# Patient Record
Sex: FEMALE | Race: ASIAN | Employment: FULL TIME | URBAN - METROPOLITAN AREA
[De-identification: names, ages, dates, MRNs, and addresses within clinical notes are randomized per-mention and may not be internally consistent; named-entity substitution may affect disease eponyms.]

---

## 2017-01-16 ENCOUNTER — HOSPITAL ENCOUNTER (OUTPATIENT)
Age: 22
Discharge: HOME | End: 2017-01-16
Attending: FAMILY MEDICINE
Payer: COMMERCIAL

## 2017-01-16 VITALS
HEART RATE: 108 BPM | TEMPERATURE: 98 F | RESPIRATION RATE: 18 BRPM | DIASTOLIC BLOOD PRESSURE: 79 MMHG | OXYGEN SATURATION: 98 % | SYSTOLIC BLOOD PRESSURE: 117 MMHG

## 2017-01-16 DIAGNOSIS — J06.9 UPPER RESPIRATORY TRACT INFECTION, UNSPECIFIED TYPE: Primary | ICD-10-CM

## 2017-01-16 DIAGNOSIS — Z20.89 EXPOSURE TO PNEUMONIA: ICD-10-CM

## 2017-01-16 PROCEDURE — 99213 OFFICE O/P EST LOW 20 MIN: CPT

## 2017-01-16 PROCEDURE — 99214 OFFICE O/P EST MOD 30 MIN: CPT

## 2017-01-16 RX ORDER — FLUTICASONE PROPIONATE 50 MCG
SPRAY, SUSPENSION (ML) NASAL
Qty: 1 INHALER | Refills: 0 | Status: SHIPPED | OUTPATIENT
Start: 2017-01-16 | End: 2017-11-02 | Stop reason: ALTCHOICE

## 2017-01-16 RX ORDER — AZITHROMYCIN 250 MG/1
TABLET, FILM COATED ORAL
Qty: 1 PACKAGE | Refills: 0 | Status: SHIPPED | OUTPATIENT
Start: 2017-01-16 | End: 2017-11-02 | Stop reason: ALTCHOICE

## 2017-01-16 RX ORDER — CODEINE PHOSPHATE AND GUAIFENESIN 10; 100 MG/5ML; MG/5ML
SOLUTION ORAL NIGHTLY PRN
Qty: 120 ML | Refills: 0 | Status: SHIPPED | OUTPATIENT
Start: 2017-01-16 | End: 2017-11-02 | Stop reason: ALTCHOICE

## 2017-01-17 NOTE — ED PROVIDER NOTES
Patient Seen in: [facility] In [facility]    History   Patient presents with:  Fever  Cough/URI    Stated Complaint: fever,cough     HPI    This 50-year-old female presents to the office with complaint of fever, sore throat, cough which is be 1959 117/79 mmHg   Pulse 01/16/17 1959 108   Resp 01/16/17 1959 18   Temp 01/16/17 1959 97.5 °F (36.4 °C)   Temp src 01/16/17 1959 Temporal   SpO2 01/16/17 1959 98 %   O2 Device 01/16/17 1959 None (Room air)       Current:/79 mmHg  Pulse 108  Temp(Sr MCG/ACT Nasal Suspension  Use 2 sprays each nostril once daily after shower. Stop if you develop nose bleeds.   Qty: 1 Inhaler Refills: 0  Associated Diagnoses:Upper respiratory tract infection, unspecified type    azithromycin (ZITHROMAX Z-ALY) 250 MG Oral

## 2017-01-17 NOTE — ED INITIAL ASSESSMENT (HPI)
Pt c/o fever x 2 days. T-max 101. Also c/o sore throat and cough. States has had cough for past 2 weeks but has become worse the past couple of days. Sister sick with similar symptoms. Mother recently treated for pneumonia.

## 2017-11-02 ENCOUNTER — LAB ENCOUNTER (OUTPATIENT)
Dept: LAB | Age: 22
End: 2017-11-02
Attending: FAMILY MEDICINE
Payer: COMMERCIAL

## 2017-11-02 ENCOUNTER — OFFICE VISIT (OUTPATIENT)
Dept: FAMILY MEDICINE CLINIC | Facility: CLINIC | Age: 22
End: 2017-11-02

## 2017-11-02 ENCOUNTER — TELEPHONE (OUTPATIENT)
Dept: FAMILY MEDICINE CLINIC | Facility: CLINIC | Age: 22
End: 2017-11-02

## 2017-11-02 VITALS
RESPIRATION RATE: 18 BRPM | BODY MASS INDEX: 31.49 KG/M2 | OXYGEN SATURATION: 99 % | DIASTOLIC BLOOD PRESSURE: 60 MMHG | TEMPERATURE: 98 F | WEIGHT: 148 LBS | HEIGHT: 57.5 IN | HEART RATE: 69 BPM | SYSTOLIC BLOOD PRESSURE: 104 MMHG

## 2017-11-02 DIAGNOSIS — R53.83 OTHER FATIGUE: ICD-10-CM

## 2017-11-02 DIAGNOSIS — F99 INSOMNIA DUE TO OTHER MENTAL DISORDER: ICD-10-CM

## 2017-11-02 DIAGNOSIS — L70.0 ACNE VULGARIS: ICD-10-CM

## 2017-11-02 DIAGNOSIS — F51.05 INSOMNIA DUE TO OTHER MENTAL DISORDER: ICD-10-CM

## 2017-11-02 DIAGNOSIS — Z13.6 SCREENING FOR HEART DISEASE: ICD-10-CM

## 2017-11-02 DIAGNOSIS — N92.6 IRREGULAR MENSES: ICD-10-CM

## 2017-11-02 DIAGNOSIS — N92.1 MENOMETRORRHAGIA: ICD-10-CM

## 2017-11-02 DIAGNOSIS — N92.6 IRREGULAR MENSES: Primary | ICD-10-CM

## 2017-11-02 PROCEDURE — 84146 ASSAY OF PROLACTIN: CPT | Performed by: FAMILY MEDICINE

## 2017-11-02 PROCEDURE — 80050 GENERAL HEALTH PANEL: CPT | Performed by: FAMILY MEDICINE

## 2017-11-02 PROCEDURE — 90686 IIV4 VACC NO PRSV 0.5 ML IM: CPT | Performed by: FAMILY MEDICINE

## 2017-11-02 PROCEDURE — 80061 LIPID PANEL: CPT | Performed by: FAMILY MEDICINE

## 2017-11-02 PROCEDURE — 84403 ASSAY OF TOTAL TESTOSTERONE: CPT | Performed by: FAMILY MEDICINE

## 2017-11-02 PROCEDURE — 36415 COLL VENOUS BLD VENIPUNCTURE: CPT | Performed by: FAMILY MEDICINE

## 2017-11-02 PROCEDURE — 84439 ASSAY OF FREE THYROXINE: CPT | Performed by: FAMILY MEDICINE

## 2017-11-02 PROCEDURE — 84402 ASSAY OF FREE TESTOSTERONE: CPT | Performed by: FAMILY MEDICINE

## 2017-11-02 PROCEDURE — 84305 ASSAY OF SOMATOMEDIN: CPT | Performed by: FAMILY MEDICINE

## 2017-11-02 PROCEDURE — 90471 IMMUNIZATION ADMIN: CPT | Performed by: FAMILY MEDICINE

## 2017-11-02 PROCEDURE — 82627 DEHYDROEPIANDROSTERONE: CPT | Performed by: FAMILY MEDICINE

## 2017-11-02 PROCEDURE — 82533 TOTAL CORTISOL: CPT | Performed by: FAMILY MEDICINE

## 2017-11-02 PROCEDURE — 99203 OFFICE O/P NEW LOW 30 MIN: CPT | Performed by: FAMILY MEDICINE

## 2017-11-02 RX ORDER — HYDROXYZINE 50 MG/1
50 TABLET, FILM COATED ORAL NIGHTLY PRN
Qty: 30 TABLET | Refills: 0 | Status: SHIPPED | OUTPATIENT
Start: 2017-11-02 | End: 2017-11-02

## 2017-11-02 RX ORDER — IBUPROFEN 800 MG/1
800 TABLET ORAL 3 TIMES DAILY
Qty: 21 TABLET | Refills: 0 | Status: SHIPPED | OUTPATIENT
Start: 2017-11-02 | End: 2018-01-09

## 2017-11-02 RX ORDER — ADAPALENE 3 MG/G
1 GEL TOPICAL NIGHTLY
Qty: 45 G | Refills: 12 | Status: SHIPPED | OUTPATIENT
Start: 2017-11-02 | End: 2019-05-15

## 2017-11-02 RX ORDER — HYDROXYZINE 50 MG/1
50 TABLET, FILM COATED ORAL NIGHTLY PRN
Qty: 30 TABLET | Refills: 0 | Status: SHIPPED | OUTPATIENT
Start: 2017-11-02 | End: 2018-01-09

## 2017-11-02 NOTE — PATIENT INSTRUCTIONS
My fitness pal angelita by Carly- download and track all food and drinks  -limit 70g carbohydrate daily  Limit 1500 calories daily  -Evaluated regular meals which are high in carbohydrate with poor nutrition and lack of vegetables  -Needs exercise 5 ti · Read labels on makeup and moisturizers. Choose those that are water based and oil free. Look for the term noncomedogenic. It means that the product won’t clog your pores. Caring for your skin  The right skin care routine can help keep your skin healthy. Having a period (menstruation) is a normal, healthy part of being a woman. It’s also part of the menstrual cycle, a process that makes it possible for women to become pregnant.  The first day of your period is the first day of your menstrual cycle.   A woma © 3358-0879 [employer] [address]. [address] [facility], [facility], [facility] [place]. All rights reserved. This information is not intended as a substitute for professional medical care. Always follow your healthcare professional's instructions.

## 2017-11-02 NOTE — PROGRESS NOTES
CHIEF COMPLAINT: Patient presents with:  Establish Care: continuing care        HPI:     Cathy Gooden is a 25year old female presents for establish care and discuss irregular menses since of onset at age 16 cycle varies lasts 7-14 days every 45-9 sedation. no driving for 8 hours after use. ). Disp: 30 tablet Rfl: 0   ibuprofen 800 MG Oral Tab Take 1 tablet (800 mg total) by mouth 3 (three) times daily.  Take with food and water Disp: 21 tablet Rfl: 0       Allergies:    Seasonal                Runny 12    2. Irregular menses  PCO S versus endometriosis versus other cause rule out  - CORTISOL; Future-draw 8 AM  - IGF-1; Future  - PROLACTIN; Future  - TESTOSTERONE,FREE AND TOTAL;  Future  - TSH+FREE T4; Future  - DEHYDROEPIANDROSTERONE SULFATE; Future  - Series) due on 06/17/1996  Hepatitis A Vaccines(1 of 2 - Standard Series) due on 06/17/1997  MMR Vaccines(1 of 2) due on 06/17/1997  Annual Physical due on 06/17/1998  DTaP,Tdap,and Td Vaccines(1 - Tdap) due on 06/17/2007  HPV Vaccines(1 of 3 - Female 3 Do

## 2017-11-02 NOTE — TELEPHONE ENCOUNTER
hydroxyzine cancelled in prescription cancellation error, gave verbal to pharmacy to fill hydroxyzine as ordered by  at today's visit. notified patient pharmacy will call when ready to .

## 2017-11-06 ENCOUNTER — TELEPHONE (OUTPATIENT)
Dept: FAMILY MEDICINE CLINIC | Facility: CLINIC | Age: 22
End: 2017-11-06

## 2017-11-06 NOTE — TELEPHONE ENCOUNTER
Spoke with pt, reviewed results and recommendations. Pt verbalized understanding, will follow up as planned      Notes Recorded by Nora Marr DO on 11/6/2017 at 8:17 AM CST  Results reviewed. Tests show no significant abnormalities except low HDL.  Ple

## 2018-01-09 ENCOUNTER — TELEPHONE (OUTPATIENT)
Dept: FAMILY MEDICINE CLINIC | Facility: CLINIC | Age: 23
End: 2018-01-09

## 2018-01-09 DIAGNOSIS — N92.1 MENOMETRORRHAGIA: ICD-10-CM

## 2018-01-09 DIAGNOSIS — F51.05 INSOMNIA DUE TO OTHER MENTAL DISORDER: ICD-10-CM

## 2018-01-09 DIAGNOSIS — F99 INSOMNIA DUE TO OTHER MENTAL DISORDER: ICD-10-CM

## 2018-01-10 NOTE — TELEPHONE ENCOUNTER
Pt requesting refill of Hydroxyzine and Ibuprofen, no protocol, unable to approve:     Last Time Medication was Filled:  11/2/17    Last Office Visit with PCP: 11/2/17    No future appointments.

## 2018-01-11 RX ORDER — IBUPROFEN 800 MG/1
TABLET ORAL
Qty: 21 TABLET | Refills: 0 | Status: SHIPPED | OUTPATIENT
Start: 2018-01-11 | End: 2019-05-15

## 2018-01-11 RX ORDER — HYDROXYZINE 50 MG/1
TABLET, FILM COATED ORAL
Qty: 30 TABLET | Refills: 0 | Status: SHIPPED | OUTPATIENT
Start: 2018-01-11 | End: 2018-10-29

## 2018-01-11 NOTE — TELEPHONE ENCOUNTER
LMOM to return call to the office. Provided pt office phone [phone] along with office hours given.

## 2018-01-11 NOTE — TELEPHONE ENCOUNTER
Spoke to patient, patient states she will complete US. Will follow up in office. No future appointments.

## 2018-01-11 NOTE — TELEPHONE ENCOUNTER
Last refill until completes pelvic ultrasound/testing for heavy prolonged menses. Needs to complete and FU in office within one week of completion.

## 2018-02-04 ENCOUNTER — HOSPITAL ENCOUNTER (OUTPATIENT)
Age: 23
Discharge: HOME | End: 2018-02-04
Attending: FAMILY MEDICINE
Payer: COMMERCIAL

## 2018-02-04 VITALS
OXYGEN SATURATION: 97 % | RESPIRATION RATE: 16 BRPM | TEMPERATURE: 99 F | WEIGHT: 150 LBS | BODY MASS INDEX: 32 KG/M2 | SYSTOLIC BLOOD PRESSURE: 117 MMHG | DIASTOLIC BLOOD PRESSURE: 71 MMHG | HEART RATE: 87 BPM

## 2018-02-04 DIAGNOSIS — J20.9 ACUTE BRONCHITIS, UNSPECIFIED ORGANISM: Primary | ICD-10-CM

## 2018-02-04 DIAGNOSIS — J02.9 ACUTE PHARYNGITIS, UNSPECIFIED ETIOLOGY: ICD-10-CM

## 2018-02-04 DIAGNOSIS — H92.02 ACUTE OTALGIA, LEFT: ICD-10-CM

## 2018-02-04 LAB — POCT RAPID STREP: NEGATIVE

## 2018-02-04 PROCEDURE — 87430 STREP A AG IA: CPT | Performed by: FAMILY MEDICINE

## 2018-02-04 PROCEDURE — 99214 OFFICE O/P EST MOD 30 MIN: CPT

## 2018-02-04 PROCEDURE — 87081 CULTURE SCREEN ONLY: CPT | Performed by: FAMILY MEDICINE

## 2018-02-04 RX ORDER — AMOXICILLIN 875 MG/1
875 TABLET, COATED ORAL 2 TIMES DAILY
Qty: 14 TABLET | Refills: 0 | Status: SHIPPED | OUTPATIENT
Start: 2018-02-04 | End: 2018-03-19 | Stop reason: ALTCHOICE

## 2018-02-04 RX ORDER — ALBUTEROL SULFATE 90 UG/1
2 AEROSOL, METERED RESPIRATORY (INHALATION) EVERY 4 HOURS PRN
Qty: 1 INHALER | Refills: 0 | Status: SHIPPED | OUTPATIENT
Start: 2018-02-04 | End: 2020-05-20 | Stop reason: ALTCHOICE

## 2018-02-04 NOTE — ED PROVIDER NOTES
Patient Seen in: [facility]    History   Patient presents with:  Fever (infectious)  Sore Throat    Stated Complaint: ST/EAR PAIN/RED EYES/FEVER    HPI    This 69-year-old female presents the office with a week history of worsen bleeding noted. PHARYNX:  Mild erythema is noted, no exudates seen, airway patent, uvula midline  NECK:  Shotty anterior cervical lymphadenopathy. No thyromegaly,  HEART: Regular rate and rhythm, no S3, S4 or murmur noted.   LUNGS: Coarse BS with prolonged with breakfast and dinner for the next 7 days. While on the antibiotics, eat yogurt or take probiotics to help replenish the good bacteria in your intestines.   Use the albuterol inhaler 2 puffs every 4 hours as needed for chest tightness, shortness of coreen

## 2018-02-04 NOTE — ED INITIAL ASSESSMENT (HPI)
Patient whispers that she has been sick for about 1 week with throat pain, fever, and ear pain. Patient reports fever as high as 101.

## 2018-02-19 DIAGNOSIS — L70.0 ACNE VULGARIS: ICD-10-CM

## 2018-02-19 RX ORDER — ADAPALENE 3 MG/G
1 GEL TOPICAL NIGHTLY
Qty: 45 G | Refills: 12
Start: 2018-02-19

## 2018-02-19 NOTE — TELEPHONE ENCOUNTER
From: Shivani Mars  Sent: 2/19/2018 4:31 PM CST  Subject: Medication Renewal Request    Sarah Stewart would like a refill of the following medications:     Adapalene 0.3 % External Gel [facility], DO]    Preferred pharmacy: [address] [place] [address]

## 2018-02-28 ENCOUNTER — HOSPITAL ENCOUNTER (OUTPATIENT)
Dept: ULTRASOUND IMAGING | Age: 23
Discharge: HOME | End: 2018-02-28
Attending: FAMILY MEDICINE
Payer: COMMERCIAL

## 2018-02-28 DIAGNOSIS — N92.6 IRREGULAR MENSES: ICD-10-CM

## 2018-02-28 DIAGNOSIS — N92.1 MENOMETRORRHAGIA: ICD-10-CM

## 2018-02-28 PROCEDURE — 76856 US EXAM PELVIC COMPLETE: CPT | Performed by: FAMILY MEDICINE

## 2018-03-01 ENCOUNTER — TELEPHONE (OUTPATIENT)
Dept: FAMILY MEDICINE CLINIC | Facility: CLINIC | Age: 23
End: 2018-03-01

## 2018-03-01 NOTE — TELEPHONE ENCOUNTER
LMOM to return call to the office. Provided pt office phone [phone] along with office hours given. Notes Recorded by Mable Mattson MD on 3/1/2018 at 8:39 AM CST  Results reviewed. Tests show no significant abnormalities. Please inform patient.  Fo

## 2018-03-01 NOTE — TELEPHONE ENCOUNTER
Pt returning nurses call, pt states she can be reached back at [phone].  Pt states she might not be able to answer phone until 3:30-4:00pm.

## 2018-03-19 ENCOUNTER — OFFICE VISIT (OUTPATIENT)
Dept: FAMILY MEDICINE CLINIC | Facility: CLINIC | Age: 23
End: 2018-03-19

## 2018-03-19 VITALS
HEART RATE: 86 BPM | OXYGEN SATURATION: 99 % | TEMPERATURE: 98 F | BODY MASS INDEX: 33 KG/M2 | RESPIRATION RATE: 18 BRPM | WEIGHT: 156 LBS | SYSTOLIC BLOOD PRESSURE: 114 MMHG | DIASTOLIC BLOOD PRESSURE: 68 MMHG

## 2018-03-19 DIAGNOSIS — E28.2 PCOS (POLYCYSTIC OVARIAN SYNDROME): ICD-10-CM

## 2018-03-19 DIAGNOSIS — F99 INSOMNIA DUE TO OTHER MENTAL DISORDER: ICD-10-CM

## 2018-03-19 DIAGNOSIS — R53.83 OTHER FATIGUE: ICD-10-CM

## 2018-03-19 DIAGNOSIS — Z23 NEED FOR VACCINATION: ICD-10-CM

## 2018-03-19 DIAGNOSIS — N92.6 IRREGULAR MENSES: Primary | ICD-10-CM

## 2018-03-19 DIAGNOSIS — F41.9 ANXIETY: ICD-10-CM

## 2018-03-19 DIAGNOSIS — F51.05 INSOMNIA DUE TO OTHER MENTAL DISORDER: ICD-10-CM

## 2018-03-19 PROCEDURE — 90715 TDAP VACCINE 7 YRS/> IM: CPT | Performed by: FAMILY MEDICINE

## 2018-03-19 PROCEDURE — 90471 IMMUNIZATION ADMIN: CPT | Performed by: FAMILY MEDICINE

## 2018-03-19 PROCEDURE — 99214 OFFICE O/P EST MOD 30 MIN: CPT | Performed by: FAMILY MEDICINE

## 2018-03-19 RX ORDER — ETONOGESTREL AND ETHINYL ESTRADIOL 11.7; 2.7 MG/1; MG/1
1 INSERT, EXTENDED RELEASE VAGINAL
Qty: 1 EACH | Refills: 6 | Status: SHIPPED | OUTPATIENT
Start: 2018-03-19 | End: 2018-10-29

## 2018-03-19 RX ORDER — ESCITALOPRAM OXALATE 5 MG/1
TABLET ORAL
Qty: 49 TABLET | Refills: 0 | Status: SHIPPED | OUTPATIENT
Start: 2018-03-19 | End: 2018-04-17

## 2018-03-19 NOTE — PATIENT INSTRUCTIONS
· Taking antidepressants can put you at risk of increased suicidal thoughts or depression  · If use feel suicidal or homicidal, call 911 or go to the nearest ER or call our office.     Condoms are recommended in all  and unmarried couples due to help Anxiety disorders tend to run in families. For some people, childhood abuse or neglect may play a role. For others, stressful life events or trauma may trigger anxiety disorders. Anxiety can trigger low self-esteem and poor coping skills.   Common anxiety d Insert the ring into your vagina as directed. Follow the directions on the prescription label. The ring will remain place for 3 weeks and is then removed for a 1-week break.  A new ring is inserted 1 week after the last ring was removed, on the same day of · breakthrough bleeding and spotting that continues beyond the 3 initial cycles of pills  · breast tenderness  · mood changes, anxiety, depression, frustration, anger, or emotional outbursts  · increased sensitivity to sun or ultraviolet light  · nausea  · Store at room temperature between 15 and 30 degrees C (59 and 86 degrees F) for up to 4 months. The product will  after 4 months. Protect from light. Throw away any unused medicine after the expiration date.   What should I tell my health care provide This medicine can make your body retain fluid, making your fingers, hands, or ankles swell. Your blood pressure can go up. Contact your doctor or health care professional if you feel you are retaining fluid.   This medicine can make you more sensitive to th

## 2018-03-19 NOTE — PROGRESS NOTES
CHIEF COMPLAINT: Patient presents with: Follow - Up: U/S results and immunizations        HPI:     Shivani Mars is a 25year old female presents for discuss labs, pelvic ultrasound.   Menstrual cycle is every 45-90 days and can last anywhere from Paternal Grandmother    • Heart Attack Paternal Grandfather    • Diabetes Paternal Grandfather    • Hypertension Paternal Grandfather    • No Known Problems Brother    • No Known Problems Brother    • No Known Problems Brother       Social History: Smoking icteric bilaterally. Oral pharynx clear without normal finding. Moist mucous membranes. Neck: supple. No adenopathy. No thyromegaly. Heart: RRR without S3 or S4 murmur . Clear S1S2  Lungs: clear to auscultation bilaterally.  No rales, rhonchi or wheeze • Chol/HDL Ratio 11/02/2017 3.50    • Non HDL Chol 11/02/2017 110    • WBC 11/02/2017 8.1    • RBC 11/02/2017 4.25    • HGB 11/02/2017 12.4    • HCT 11/02/2017 38.1    • PLT 11/02/2017 371.0    • MCV 11/02/2017 89.6    • MCH 11/02/2017 29.2    • MCHC 11/ syndrome)  Normal ultrasound negative PCO S lab workup  At risk of infertility and diabetes, hyperlipidemia, metabolic syndrome  Weight loss recommended. Exercise cardio minimum 150 minutes a week. Possible dysmenorrhea may also have endometriosis.   Woul Vaccines(1 of 2) due on 06/17/1997  Annual Physical due on 06/17/1998  DTaP,Tdap,and Td Vaccines(1 - Tdap) due on 06/17/2007  HPV Vaccines(1 of 3 - Female 3 Dose Series) due on 06/17/2007  Varicella Vaccines(1 of 2 - 2 Dose Adolescent Series) due on 06/17/

## 2018-04-18 NOTE — TELEPHONE ENCOUNTER
From: Shannon Bower  Sent: 4/17/2018 4:31 PM CDT  Subject: Medication Renewal Request    Sarah Price Tyler would like a refill of the following medications:     escitalopram 5 MG Oral Tab WILMER James    Preferred pharmacy: [place] [id number]

## 2018-04-18 NOTE — TELEPHONE ENCOUNTER
Pt requesting refill of Escitalopram, no protocol, unable to approve:     Last Time Medication was Filled:  3/19/18    Last Office Visit with PCP: 3/19/18    Future Appointments  Date Time Provider Adrianna Horn   4/20/2018 1:20 PM DO STACY Thomas

## 2018-04-19 RX ORDER — ESCITALOPRAM OXALATE 10 MG/1
10 TABLET ORAL DAILY
Qty: 90 TABLET | Refills: 0 | Status: SHIPPED
Start: 2018-04-19 | End: 2018-10-29

## 2018-04-20 PROCEDURE — 87591 N.GONORRHOEAE DNA AMP PROB: CPT | Performed by: FAMILY MEDICINE

## 2018-04-20 PROCEDURE — 87491 CHLMYD TRACH DNA AMP PROBE: CPT | Performed by: FAMILY MEDICINE

## 2018-04-20 NOTE — PROGRESS NOTES
CHIEF COMPLAINT: Patient presents with: Follow - Up: medication      HPI:     Shivani Mars is a 25year old female presents for recheck anxiety. Doing well on Lexapro. Able to study and focus. Maintaining grades. sleeping well.   Not ruminating status: Passive Smoke Exposure - Never Smoker                                      Packs/day: 0.00      Years: 0.00      Smokeless tobacco: Never Used                      Alcohol use:  No                 Medications (Active prior to today's visit):    Curr murmur . Clear S1S2  Lungs: clear to auscultation bilaterally. No rales, rhonchi or wheezes. Good inspiratory and expiratory effort  Extremities: No cyanosis, clubbing, or edema bilaterally. Skin: no acute rashes. Neuro: AOx3.   Normal gait  Psyche: nor Series) due on 06/17/2007  Varicella Vaccines(1 of 2 - 2 Dose Adolescent Series) due on 06/17/2009  Meningococcal Vaccine(1 of 1) due on 06/17/2012  Pap Smear,2 Years due on 06/17/2017  Influenza Vaccine(1) due on 09/01/2017      Patient/Caregiver Educatio

## 2018-04-20 NOTE — PATIENT INSTRUCTIONS
· Taking antidepressants can put you at risk of increased suicidal thoughts or depression  · If use feel suicidal or homicidal, call 911 or go to the nearest ER or call our office.     Understanding Anxiety Disorders  Almost everyone gets nervous now and th · Generalized anxiety disorder. This causes constant worry that can greatly disrupt your life. Getting better  You may believe that nothing can help you. Or, you might fear what others may think. But most anxiety symptoms can be eased.  Having an anxiety

## 2018-04-23 ENCOUNTER — TELEPHONE (OUTPATIENT)
Dept: FAMILY MEDICINE CLINIC | Facility: CLINIC | Age: 23
End: 2018-04-23

## 2018-04-24 ENCOUNTER — TELEPHONE (OUTPATIENT)
Dept: FAMILY MEDICINE CLINIC | Facility: CLINIC | Age: 23
End: 2018-04-24

## 2018-04-24 NOTE — TELEPHONE ENCOUNTER
Spoke with pt, reviewed results, pt verbalized understanding    Notes recorded by Cary Alvares DO on 4/22/2018 at 11:14 PM CDT  Results reviewed. Tests show no significant abnormalities. Please inform patient.

## 2018-06-18 NOTE — TELEPHONE ENCOUNTER
Patient signed medical records authorization form for the Facility identified below to disclose health information to Ta Manning / Provider Name: Irene Gonzalo [address] [facility] Address: [address]

## 2018-06-28 ENCOUNTER — TELEPHONE (OUTPATIENT)
Dept: FAMILY MEDICINE CLINIC | Facility: CLINIC | Age: 23
End: 2018-06-28

## 2018-06-28 NOTE — TELEPHONE ENCOUNTER
Pt called stating that the Insurance denied her birth control refill they stated to her that she needs a new prescription from Dr. Tono Olson for 90 days and it has to be [facility].

## 2018-06-28 NOTE — TELEPHONE ENCOUNTER
Left message for pt to [facility] that she wishes to use, and have pharmacy transfer her prescription.  Office hours and phone # provided for questions    Pt is due for annual exam

## 2018-10-29 DIAGNOSIS — F51.05 INSOMNIA DUE TO OTHER MENTAL DISORDER: ICD-10-CM

## 2018-10-29 DIAGNOSIS — F99 INSOMNIA DUE TO OTHER MENTAL DISORDER: ICD-10-CM

## 2018-10-29 DIAGNOSIS — N92.6 IRREGULAR MENSES: ICD-10-CM

## 2018-10-29 DIAGNOSIS — E28.2 PCOS (POLYCYSTIC OVARIAN SYNDROME): ICD-10-CM

## 2018-10-30 RX ORDER — ESCITALOPRAM OXALATE 10 MG/1
10 TABLET ORAL DAILY
Qty: 90 TABLET | Refills: 0 | Status: CANCELLED | OUTPATIENT
Start: 2018-10-30

## 2018-10-30 RX ORDER — ETONOGESTREL AND ETHINYL ESTRADIOL 11.7; 2.7 MG/1; MG/1
1 INSERT, EXTENDED RELEASE VAGINAL
Qty: 3 EACH | Refills: 0 | Status: SHIPPED | OUTPATIENT
Start: 2018-10-30 | End: 2019-05-15

## 2018-10-30 RX ORDER — ESCITALOPRAM OXALATE 10 MG/1
10 TABLET ORAL DAILY
Qty: 90 TABLET | Refills: 0 | Status: SHIPPED | OUTPATIENT
Start: 2018-10-30 | End: 2019-05-15 | Stop reason: ALTCHOICE

## 2018-10-30 RX ORDER — HYDROXYZINE 50 MG/1
TABLET, FILM COATED ORAL
Qty: 30 TABLET | Refills: 0 | Status: SHIPPED | OUTPATIENT
Start: 2018-10-30

## 2018-10-30 NOTE — TELEPHONE ENCOUNTER
Pt requesting refill of Nuvaring, protocol failed, unable to approve:     Last Time Medication was Filled:  3/19/18    Last Office Visit with PCP: 4/20/18    No future appointments.       Pt requesting refill of Hydroxyzine and Escitalopram, no protocol , u

## 2018-11-05 ENCOUNTER — TELEPHONE (OUTPATIENT)
Dept: FAMILY MEDICINE CLINIC | Facility: CLINIC | Age: 23
End: 2018-11-05

## 2018-11-05 NOTE — TELEPHONE ENCOUNTER
LF Escitalopram was 10/30/18 for 90 day with   Jeremy. Called to clarify if pt filled last rcs from 10/30 18 due to new request from different pharmacy. Jeremy states they LM for pt to  RX. Pt has not picked up medication.

## 2018-11-05 NOTE — TELEPHONE ENCOUNTER
[facility] States [facility] have a rx to fill Escitalopram for 30 day, but needs order for 90 day supply. Pharmacy states they called pt to clarify RX request not return call.  Informed pharmacy I will call pt to clarify which pharmacy pt would be filling medications a

## 2018-11-05 NOTE — TELEPHONE ENCOUNTER
LMOM to return call to the office. Provided pt office phone [phone] along with office hours given.     LM need clarification whereEscitalopram as RX refill was sent to [facility] 10/30/18

## 2018-11-05 NOTE — TELEPHONE ENCOUNTER
[address] from [address] [place] [address] called to see if they could get a script for 90 days for [address] 30

## 2018-11-13 NOTE — TELEPHONE ENCOUNTER
LMOM to return call to the office as this is my 2nd attempt to try to reach you. Provided pt office phone [phone] along with office hours given.

## 2018-11-14 NOTE — TELEPHONE ENCOUNTER
LMOM to return call to the office as this was my 3rd and final attempt to try to reach you. Provided pt office phone [phone] along with office hours given. Also, mailed patient a letter to contact office. Closing encounter.

## 2019-02-27 ENCOUNTER — PATIENT OUTREACH (OUTPATIENT)
Dept: FAMILY MEDICINE CLINIC | Facility: CLINIC | Age: 24
End: 2019-02-27

## 2019-04-26 ENCOUNTER — HOSPITAL ENCOUNTER (OUTPATIENT)
Age: 24
Discharge: HOME | End: 2019-04-26
Payer: COMMERCIAL

## 2019-04-26 VITALS
SYSTOLIC BLOOD PRESSURE: 126 MMHG | TEMPERATURE: 98 F | DIASTOLIC BLOOD PRESSURE: 84 MMHG | HEART RATE: 86 BPM | RESPIRATION RATE: 18 BRPM

## 2019-04-26 DIAGNOSIS — N89.8 VAGINAL DISCHARGE: Primary | ICD-10-CM

## 2019-04-26 PROCEDURE — 99214 OFFICE O/P EST MOD 30 MIN: CPT

## 2019-04-26 PROCEDURE — 87480 CANDIDA DNA DIR PROBE: CPT | Performed by: PHYSICIAN ASSISTANT

## 2019-04-26 PROCEDURE — 87510 GARDNER VAG DNA DIR PROBE: CPT | Performed by: PHYSICIAN ASSISTANT

## 2019-04-26 PROCEDURE — 87660 TRICHOMONAS VAGIN DIR PROBE: CPT | Performed by: PHYSICIAN ASSISTANT

## 2019-04-26 RX ORDER — FLUCONAZOLE 150 MG/1
TABLET ORAL
Qty: 2 TABLET | Refills: 0 | Status: SHIPPED | OUTPATIENT
Start: 2019-04-26 | End: 2019-05-15 | Stop reason: ALTCHOICE

## 2019-04-27 NOTE — ED INITIAL ASSESSMENT (HPI)
C/o vaginal irritation that started yesterday. White, creamy discharge. Denies odor. Not currently sexually active.

## 2019-04-27 NOTE — ED PROVIDER NOTES
Patient Seen in: [facility]    History   Patient presents with:  Eval-G (genital)    Stated Complaint: yeast infection    HPI    Patient is a 52-year-old female. No significant medical history.   She is not currently sexually ac within the vaginal vault. No cervical motion tenderness. Some irregularity of the coloration of the cervical os.   Neuro: Cranial nerves intact, Normal Gait    ED Course   Labs Reviewed - No data to display             MDM       With a nursing chaperone,

## 2019-04-29 NOTE — ED NOTES
Call placed to pt (or parent). Message left on voice mail thanking pt for using our services. Please call if any questions or concerns. Return phone number provided.

## 2019-05-15 ENCOUNTER — OFFICE VISIT (OUTPATIENT)
Dept: INTERNAL MEDICINE CLINIC | Facility: CLINIC | Age: 24
End: 2019-05-15
Payer: COMMERCIAL

## 2019-05-15 ENCOUNTER — TELEPHONE (OUTPATIENT)
Dept: INTERNAL MEDICINE CLINIC | Facility: CLINIC | Age: 24
End: 2019-05-15

## 2019-05-15 VITALS
DIASTOLIC BLOOD PRESSURE: 70 MMHG | HEART RATE: 125 BPM | HEIGHT: 58.25 IN | BODY MASS INDEX: 30.1 KG/M2 | WEIGHT: 145.38 LBS | TEMPERATURE: 99 F | RESPIRATION RATE: 14 BRPM | OXYGEN SATURATION: 99 % | SYSTOLIC BLOOD PRESSURE: 124 MMHG

## 2019-05-15 DIAGNOSIS — N92.1 MENOMETRORRHAGIA: ICD-10-CM

## 2019-05-15 DIAGNOSIS — E55.9 VITAMIN D DEFICIENCY: ICD-10-CM

## 2019-05-15 DIAGNOSIS — E28.2 PCOS (POLYCYSTIC OVARIAN SYNDROME): ICD-10-CM

## 2019-05-15 DIAGNOSIS — N92.6 IRREGULAR MENSES: ICD-10-CM

## 2019-05-15 DIAGNOSIS — Z00.00 ENCOUNTER FOR ANNUAL PHYSICAL EXAM: Primary | ICD-10-CM

## 2019-05-15 DIAGNOSIS — Z23 NEED FOR VACCINATION: ICD-10-CM

## 2019-05-15 DIAGNOSIS — Z13.0 SCREENING FOR IRON DEFICIENCY ANEMIA: ICD-10-CM

## 2019-05-15 DIAGNOSIS — Z13.29 SCREENING FOR THYROID DISORDER: ICD-10-CM

## 2019-05-15 DIAGNOSIS — Z13.1 SCREENING FOR DIABETES MELLITUS: ICD-10-CM

## 2019-05-15 DIAGNOSIS — Z23 NEED FOR PROPHYLACTIC VACCINATION AGAINST HUMAN PAPILLOMAVIRUS (HPV) TYPES 6, 11, 16, AND 18: Primary | ICD-10-CM

## 2019-05-15 DIAGNOSIS — Z13.228 SCREENING FOR METABOLIC DISORDER: ICD-10-CM

## 2019-05-15 DIAGNOSIS — Z13.89 SCREENING FOR GENITOURINARY CONDITION: ICD-10-CM

## 2019-05-15 DIAGNOSIS — Z13.220 SCREENING FOR LIPOID DISORDERS: ICD-10-CM

## 2019-05-15 DIAGNOSIS — L70.0 ACNE VULGARIS: ICD-10-CM

## 2019-05-15 PROCEDURE — 90471 IMMUNIZATION ADMIN: CPT | Performed by: STUDENT IN AN ORGANIZED HEALTH CARE EDUCATION/TRAINING PROGRAM

## 2019-05-15 PROCEDURE — 90651 9VHPV VACCINE 2/3 DOSE IM: CPT | Performed by: STUDENT IN AN ORGANIZED HEALTH CARE EDUCATION/TRAINING PROGRAM

## 2019-05-15 PROCEDURE — 99202 OFFICE O/P NEW SF 15 MIN: CPT | Performed by: STUDENT IN AN ORGANIZED HEALTH CARE EDUCATION/TRAINING PROGRAM

## 2019-05-15 PROCEDURE — 99385 PREV VISIT NEW AGE 18-39: CPT | Performed by: STUDENT IN AN ORGANIZED HEALTH CARE EDUCATION/TRAINING PROGRAM

## 2019-05-15 RX ORDER — ETONOGESTREL AND ETHINYL ESTRADIOL 11.7; 2.7 MG/1; MG/1
1 INSERT, EXTENDED RELEASE VAGINAL
Qty: 3 EACH | Refills: 1 | Status: SHIPPED | OUTPATIENT
Start: 2019-05-15 | End: 2019-10-24

## 2019-05-15 RX ORDER — IBUPROFEN 800 MG/1
TABLET ORAL
Qty: 21 TABLET | Refills: 0 | Status: SHIPPED | OUTPATIENT
Start: 2019-05-15 | End: 2020-06-30

## 2019-05-15 RX ORDER — ADAPALENE 3 MG/G
1 GEL TOPICAL NIGHTLY
Qty: 45 G | Refills: 0 | Status: SHIPPED | OUTPATIENT
Start: 2019-05-15 | End: 2020-05-20 | Stop reason: ALTCHOICE

## 2019-05-15 NOTE — TELEPHONE ENCOUNTER
FYI patient scheduled appointment for second HPV vaccine with  06-19-19 with nurse (Pt was told to return in 1 month after visit 05-15-19).

## 2019-05-15 NOTE — PATIENT INSTRUCTIONS
Prevention Guidelines, Women Ages 25 to 44  Screening tests and vaccines are an important part of managing your health. A screening test is done to find possible disorders or diseases in people who don't have any symptoms.  The goal is to find a disease e Type 2 diabetes, prediabetes All women diagnosed with gestational diabetes Lifelong testing every 3 years   Type 2 diabetes All women with prediabetes Every year   Gonorrhea Sexually active women at increased risk for infection At routine exams   Hepatitis Measles, mumps, rubella (MMR) All women in this age group who have no record of these infections or vaccines 1 or 2 doses   Meningococcal Women at increased risk for infection should talk with their healthcare provider 1 or more doses   Pneumococcal conjug © 4810-3433 [employer] [address]. [address] [facility] [place] [place], [facility] [place]. All rights reserved. This information is not intended as a substitute for professional medical care. Always follow your healthcare professional's instructions.

## 2019-05-15 NOTE — PROGRESS NOTES
[facility]    REASON FOR VISIT:    Dinah Curiel is a 26year old female who presents for an [address]. This is a new patient to me.    Patient's medications, allergies, past medical, surgical, social and family histories w Screen Every 10 years There are no preventive care reminders to display for this patient. Flex Sigmoidoscopy Screen  Every 5 years No results found for this or any previous visit.     Fecal Occult Blood  Annually No results found for: FOB, OCCULTSTOOL Base) MCG/ACT Inhalation Aero Soln Inhale 2 puffs into the lungs every 4 (four) hours as needed for Wheezing or Shortness of Breath. CARRY YOUR INHALER WITH YOU.  Disp: 1 Inhaler Rfl: 0      MEDICAL INFORMATION:   Past Medical History:   Diagnosis Date   • for confusion and agitation. The patient is not nervous/anxious.     EXAM:   /70 (BP Location: Right arm, Patient Position: Sitting, Cuff Size: adult)   Pulse (!) 125   Temp 98.9 °F (37.2 °C) (Oral)   Resp 14   Ht 58.25\"   Wt 145 lb 6.4 oz   LMP 04/1 ASSESSMENT AND PLAN WITH OTHER RELEVANT CHRONIC CONDITIONS   Lindaann Riedel is a 26year old female who presents for an [address]      Encounter for annual physical exam  (primary encounter diagnosis)  PCOS (polycystic ovarian syn CBC WITH DIFFERENTIAL WITH PLATELET; Future    Need for vaccination  -     HPV HUMAN PAPILLOMA VIRUS VACC 9 JUS 3 DOSE IM    Screening for diabetes mellitus  -     HEMOGLOBIN A1C; Future    Screening for lipoid disorders  -     LIPID PANEL;  Future    Sc 12/30/1996, 09/19/1997   • Hpv Virus Vaccine 9 [place] [facility] 05/15/2019   • IPV 08/19/1996, 10/19/1996, 12/30/1996, 03/30/1999, 08/13/2001   • MMR 09/19/1997, 08/13/2001   • Meningococcal-Menactra 08/12/2008   • OPV 08/19/1996, 10/19/1996   • TDAP 08/12/2008, 03/

## 2019-05-16 ENCOUNTER — LAB ENCOUNTER (OUTPATIENT)
Dept: LAB | Age: 24
End: 2019-05-16
Attending: STUDENT IN AN ORGANIZED HEALTH CARE EDUCATION/TRAINING PROGRAM
Payer: COMMERCIAL

## 2019-05-16 ENCOUNTER — TELEPHONE (OUTPATIENT)
Dept: INTERNAL MEDICINE CLINIC | Facility: CLINIC | Age: 24
End: 2019-05-16

## 2019-05-16 DIAGNOSIS — Z13.228 SCREENING FOR METABOLIC DISORDER: ICD-10-CM

## 2019-05-16 DIAGNOSIS — Z13.29 SCREENING FOR THYROID DISORDER: ICD-10-CM

## 2019-05-16 DIAGNOSIS — L70.0 ACNE VULGARIS: Primary | ICD-10-CM

## 2019-05-16 DIAGNOSIS — Z13.0 SCREENING FOR IRON DEFICIENCY ANEMIA: ICD-10-CM

## 2019-05-16 DIAGNOSIS — Z13.89 SCREENING FOR GENITOURINARY CONDITION: ICD-10-CM

## 2019-05-16 DIAGNOSIS — E55.9 VITAMIN D DEFICIENCY: ICD-10-CM

## 2019-05-16 DIAGNOSIS — Z13.1 SCREENING FOR DIABETES MELLITUS: ICD-10-CM

## 2019-05-16 DIAGNOSIS — E28.2 PCOS (POLYCYSTIC OVARIAN SYNDROME): ICD-10-CM

## 2019-05-16 PROCEDURE — 82306 VITAMIN D 25 HYDROXY: CPT | Performed by: STUDENT IN AN ORGANIZED HEALTH CARE EDUCATION/TRAINING PROGRAM

## 2019-05-16 PROCEDURE — 80061 LIPID PANEL: CPT | Performed by: STUDENT IN AN ORGANIZED HEALTH CARE EDUCATION/TRAINING PROGRAM

## 2019-05-16 PROCEDURE — 83036 HEMOGLOBIN GLYCOSYLATED A1C: CPT | Performed by: STUDENT IN AN ORGANIZED HEALTH CARE EDUCATION/TRAINING PROGRAM

## 2019-05-16 PROCEDURE — 80050 GENERAL HEALTH PANEL: CPT | Performed by: STUDENT IN AN ORGANIZED HEALTH CARE EDUCATION/TRAINING PROGRAM

## 2019-05-16 PROCEDURE — 81001 URINALYSIS AUTO W/SCOPE: CPT | Performed by: STUDENT IN AN ORGANIZED HEALTH CARE EDUCATION/TRAINING PROGRAM

## 2019-05-16 PROCEDURE — 36415 COLL VENOUS BLD VENIPUNCTURE: CPT | Performed by: STUDENT IN AN ORGANIZED HEALTH CARE EDUCATION/TRAINING PROGRAM

## 2019-05-16 NOTE — TELEPHONE ENCOUNTER
Dr Sarina Olivera recommended patient see Dr Sabina Mcknight who has retired. Is there anyone else Dr Sarina Olivera would recommend for her?

## 2019-05-20 ENCOUNTER — PATIENT MESSAGE (OUTPATIENT)
Dept: INTERNAL MEDICINE CLINIC | Facility: CLINIC | Age: 24
End: 2019-05-20

## 2019-05-20 DIAGNOSIS — L70.0 ACNE VULGARIS: Primary | ICD-10-CM

## 2019-05-20 NOTE — PROGRESS NOTES
Spoke to pt, aware of results & recommendations. Pt voiced understanding. Sent Vit D script to preferred pharmacy. Vit D in 3 months ordered. LFTs in 1 month ordered, reminded to avoid Tylenol & alcohol until then. Pt verbalized understanding.

## 2019-05-20 NOTE — TELEPHONE ENCOUNTER
Sent [employer] message with [facility] [facility].

## 2019-05-28 NOTE — PROGRESS NOTES
Pt referred to [facility]. Jenny Isaacs does not practice at location anymore. Pt with PPO. Established with Dr. Josiane Simmons instead.

## 2019-05-28 NOTE — TELEPHONE ENCOUNTER
From: Maricruz Shetty  Sent: 5/24/2019 10:24 PM CDT  To: [facility] Clinical Staff  Subject: RE: Dermatology Referral    Stephany Solomon, thank you.  Just in case you guys need to update the referral, I am going to see Dr. Jewelene Hatchet, MD.    Thank you Trey John

## 2019-06-03 ENCOUNTER — TELEPHONE (OUTPATIENT)
Dept: INTERNAL MEDICINE CLINIC | Facility: CLINIC | Age: 24
End: 2019-06-03

## 2019-06-03 DIAGNOSIS — Z23 NEED FOR VACCINATION: Primary | ICD-10-CM

## 2019-06-03 NOTE — TELEPHONE ENCOUNTER
Patient needs a booster for meningitis. She starts school in August and needs it prior to that. Please advise. Thank you!

## 2019-06-03 NOTE — TELEPHONE ENCOUNTER
Order pended, can we add on to HPV #2 vaccine appointment schedule 6/19? Last meningococcal was 8/12/08. Thank you!

## 2019-06-04 NOTE — TELEPHONE ENCOUNTER
Pt had menactra in 2008 and her school requires a booster of meningococcal conjugate vaccine of the first vaccine was given before the age of 13. Order pending.

## 2019-06-05 NOTE — TELEPHONE ENCOUNTER
Left message for patient that HPV and Menactra can both be given at her upcoming appointment on 6/19/19. Advised patient to call the office with questions or concerns, or to reschedule.

## 2019-06-19 ENCOUNTER — NURSE ONLY (OUTPATIENT)
Dept: INTERNAL MEDICINE CLINIC | Facility: CLINIC | Age: 24
End: 2019-06-19
Payer: COMMERCIAL

## 2019-06-19 DIAGNOSIS — Z23 NEED FOR PROPHYLACTIC VACCINATION AGAINST HUMAN PAPILLOMAVIRUS (HPV) TYPES 6, 11, 16, AND 18: ICD-10-CM

## 2019-06-19 PROCEDURE — 90734 MENACWYD/MENACWYCRM VACC IM: CPT | Performed by: STUDENT IN AN ORGANIZED HEALTH CARE EDUCATION/TRAINING PROGRAM

## 2019-06-19 PROCEDURE — 90471 IMMUNIZATION ADMIN: CPT | Performed by: STUDENT IN AN ORGANIZED HEALTH CARE EDUCATION/TRAINING PROGRAM

## 2019-06-19 PROCEDURE — 90651 9VHPV VACCINE 2/3 DOSE IM: CPT | Performed by: STUDENT IN AN ORGANIZED HEALTH CARE EDUCATION/TRAINING PROGRAM

## 2019-06-19 PROCEDURE — 90472 IMMUNIZATION ADMIN EACH ADD: CPT | Performed by: STUDENT IN AN ORGANIZED HEALTH CARE EDUCATION/TRAINING PROGRAM

## 2019-06-19 NOTE — PROGRESS NOTES
Patient also brought immunization history form to be completed for school. Form completed, copy sent to scan.

## 2019-08-05 ENCOUNTER — TELEPHONE (OUTPATIENT)
Dept: INTERNAL MEDICINE CLINIC | Facility: CLINIC | Age: 24
End: 2019-08-05

## 2019-08-05 NOTE — TELEPHONE ENCOUNTER
Form copied, copy placed to scan. Original placed for pickup. Called and spoke with patient. Advised form placed at  for pickup. Patient verbalized understanding.

## 2019-08-05 NOTE — TELEPHONE ENCOUNTER
Pt dropped off immunization form for Dr Julia Parker to sign. Please call her at [phone] when it is ready for her to .  Thank you

## 2019-08-05 NOTE — TELEPHONE ENCOUNTER
Received form for school to be signed by physician. Form completed by patient, information verified by this RN. Form placed for Dr. Analy Roberts signature. To call patient at [phone] per her request when form is complete.

## 2019-08-10 DIAGNOSIS — E55.9 VITAMIN D DEFICIENCY: ICD-10-CM

## 2019-08-13 RX ORDER — ERGOCALCIFEROL 1.25 MG/1
CAPSULE ORAL
Qty: 12 CAPSULE | Refills: 0 | OUTPATIENT
Start: 2019-08-13

## 2019-08-26 ENCOUNTER — MED REC SCAN ONLY (OUTPATIENT)
Dept: INTERNAL MEDICINE CLINIC | Facility: CLINIC | Age: 24
End: 2019-08-26

## 2019-10-24 DIAGNOSIS — E28.2 PCOS (POLYCYSTIC OVARIAN SYNDROME): ICD-10-CM

## 2019-10-24 DIAGNOSIS — N92.6 IRREGULAR MENSES: ICD-10-CM

## 2019-10-28 RX ORDER — ETONOGESTREL/ETHINYL ESTRADIOL .12-.015MG
RING, VAGINAL VAGINAL
Qty: 3 EACH | Refills: 1 | Status: SHIPPED | OUTPATIENT
Start: 2019-10-28 | End: 2020-04-15

## 2019-11-05 ENCOUNTER — TELEPHONE (OUTPATIENT)
Dept: INTERNAL MEDICINE CLINIC | Facility: CLINIC | Age: 24
End: 2019-11-05

## 2019-11-05 DIAGNOSIS — Z01.84 IMMUNITY STATUS TESTING: Primary | ICD-10-CM

## 2019-11-05 NOTE — TELEPHONE ENCOUNTER
Pt needs titers for varicella,hep b and mmr. Pt also needs quantiferin gold tb test. Please advise.  Thank you

## 2019-11-06 NOTE — TELEPHONE ENCOUNTER
Forgot to add quantiferon TB test in original message routed. Added to orders. Called and spoke with patient, advised to check with her insurance.  Patient states that she did, and she was advised that it would depend on how the tests were coded on our

## 2019-11-06 NOTE — TELEPHONE ENCOUNTER
Patient called, requesting titers for varicella, hepatitis B, and MMR. Varicella, hepatitis B surface antigen and surface antibody, and MMR titers pended.  Will advise patient when called to notify of orders to be sure she checks with insurance regarding co

## 2019-11-07 ENCOUNTER — LAB ENCOUNTER (OUTPATIENT)
Dept: LAB | Age: 24
End: 2019-11-07
Attending: STUDENT IN AN ORGANIZED HEALTH CARE EDUCATION/TRAINING PROGRAM
Payer: COMMERCIAL

## 2019-11-07 ENCOUNTER — TELEPHONE (OUTPATIENT)
Dept: INTERNAL MEDICINE CLINIC | Facility: CLINIC | Age: 24
End: 2019-11-07

## 2019-11-07 DIAGNOSIS — R79.89 ELEVATED LFTS: ICD-10-CM

## 2019-11-07 DIAGNOSIS — E55.9 VITAMIN D DEFICIENCY: ICD-10-CM

## 2019-11-07 DIAGNOSIS — Z23 NEED FOR PROPHYLACTIC VACCINATION AGAINST HUMAN PAPILLOMAVIRUS (HPV) TYPES 6, 11, 16, AND 18: ICD-10-CM

## 2019-11-07 DIAGNOSIS — Z01.84 IMMUNITY STATUS TESTING: ICD-10-CM

## 2019-11-07 PROCEDURE — 86735 MUMPS ANTIBODY: CPT | Performed by: STUDENT IN AN ORGANIZED HEALTH CARE EDUCATION/TRAINING PROGRAM

## 2019-11-07 PROCEDURE — 86787 VARICELLA-ZOSTER ANTIBODY: CPT | Performed by: STUDENT IN AN ORGANIZED HEALTH CARE EDUCATION/TRAINING PROGRAM

## 2019-11-07 PROCEDURE — 86765 RUBEOLA ANTIBODY: CPT | Performed by: STUDENT IN AN ORGANIZED HEALTH CARE EDUCATION/TRAINING PROGRAM

## 2019-11-07 PROCEDURE — 86706 HEP B SURFACE ANTIBODY: CPT | Performed by: STUDENT IN AN ORGANIZED HEALTH CARE EDUCATION/TRAINING PROGRAM

## 2019-11-07 PROCEDURE — 82306 VITAMIN D 25 HYDROXY: CPT | Performed by: STUDENT IN AN ORGANIZED HEALTH CARE EDUCATION/TRAINING PROGRAM

## 2019-11-07 PROCEDURE — 86762 RUBELLA ANTIBODY: CPT | Performed by: STUDENT IN AN ORGANIZED HEALTH CARE EDUCATION/TRAINING PROGRAM

## 2019-11-07 PROCEDURE — 36415 COLL VENOUS BLD VENIPUNCTURE: CPT | Performed by: STUDENT IN AN ORGANIZED HEALTH CARE EDUCATION/TRAINING PROGRAM

## 2019-11-07 PROCEDURE — 87340 HEPATITIS B SURFACE AG IA: CPT | Performed by: STUDENT IN AN ORGANIZED HEALTH CARE EDUCATION/TRAINING PROGRAM

## 2019-11-07 PROCEDURE — 80076 HEPATIC FUNCTION PANEL: CPT | Performed by: STUDENT IN AN ORGANIZED HEALTH CARE EDUCATION/TRAINING PROGRAM

## 2019-11-12 NOTE — TELEPHONE ENCOUNTER
See result note. To call pt to discuss results. MMR cannot be ordered at this office - pt can go to walk in clinic or health dept.

## 2019-11-12 NOTE — TELEPHONE ENCOUNTER
Pt called requested MMR booster vaccine to add to her upcoming apt with nurses. Please advise. Thank you.

## 2019-11-13 ENCOUNTER — OFFICE VISIT (OUTPATIENT)
Dept: FAMILY MEDICINE CLINIC | Facility: CLINIC | Age: 24
End: 2019-11-13
Payer: COMMERCIAL

## 2019-11-13 ENCOUNTER — APPOINTMENT (OUTPATIENT)
Dept: LAB | Age: 24
End: 2019-11-13
Attending: STUDENT IN AN ORGANIZED HEALTH CARE EDUCATION/TRAINING PROGRAM
Payer: COMMERCIAL

## 2019-11-13 VITALS — TEMPERATURE: 98 F

## 2019-11-13 DIAGNOSIS — Z23 NEED FOR VACCINATION: Primary | ICD-10-CM

## 2019-11-13 DIAGNOSIS — Z01.84 IMMUNITY STATUS TESTING: ICD-10-CM

## 2019-11-13 PROCEDURE — 86480 TB TEST CELL IMMUN MEASURE: CPT | Performed by: STUDENT IN AN ORGANIZED HEALTH CARE EDUCATION/TRAINING PROGRAM

## 2019-11-13 PROCEDURE — 36415 COLL VENOUS BLD VENIPUNCTURE: CPT | Performed by: STUDENT IN AN ORGANIZED HEALTH CARE EDUCATION/TRAINING PROGRAM

## 2019-11-13 PROCEDURE — 90471 IMMUNIZATION ADMIN: CPT | Performed by: NURSE PRACTITIONER

## 2019-11-13 PROCEDURE — 90707 MMR VACCINE SC: CPT | Performed by: NURSE PRACTITIONER

## 2019-11-13 NOTE — PROGRESS NOTES
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 She had Titers run and her MMR immunity was \"Unequivical\". The school told her that she needed to complete 2 boosters of MMR. This will be her first booster.     Tolerated the vaccinat

## 2019-11-14 ENCOUNTER — TELEPHONE (OUTPATIENT)
Dept: INTERNAL MEDICINE CLINIC | Facility: CLINIC | Age: 24
End: 2019-11-14

## 2019-11-14 DIAGNOSIS — Z23 NEED FOR HEPATITIS B BOOSTER VACCINATION: Primary | ICD-10-CM

## 2019-11-14 NOTE — TELEPHONE ENCOUNTER
See result note. Pt has had hep B series in past but titers show lack of immunity. Please clarify if needs 1 booster or restart series (3 shots). 1 pended for dr Lakesha Sandoval.  Pt has nurse visit 11/15/19

## 2019-11-15 ENCOUNTER — NURSE ONLY (OUTPATIENT)
Dept: INTERNAL MEDICINE CLINIC | Facility: CLINIC | Age: 24
End: 2019-11-15
Payer: COMMERCIAL

## 2019-11-15 PROCEDURE — 90746 HEPB VACCINE 3 DOSE ADULT IM: CPT | Performed by: STUDENT IN AN ORGANIZED HEALTH CARE EDUCATION/TRAINING PROGRAM

## 2019-11-15 PROCEDURE — 90472 IMMUNIZATION ADMIN EACH ADD: CPT | Performed by: STUDENT IN AN ORGANIZED HEALTH CARE EDUCATION/TRAINING PROGRAM

## 2019-11-15 PROCEDURE — 90471 IMMUNIZATION ADMIN: CPT | Performed by: STUDENT IN AN ORGANIZED HEALTH CARE EDUCATION/TRAINING PROGRAM

## 2019-11-15 PROCEDURE — 90651 9VHPV VACCINE 2/3 DOSE IM: CPT | Performed by: STUDENT IN AN ORGANIZED HEALTH CARE EDUCATION/TRAINING PROGRAM

## 2019-11-15 NOTE — PROGRESS NOTES
Pt here for hep B booster (see result notes) and 3rd HPV, admin IM separate arms. Name and  was verified prior to admin. Vis given to pt. Copy of labs and updated immunization record given to pt.  Pt asked to wait in lobby for 10-15 min post administrati

## 2019-12-04 ENCOUNTER — TELEPHONE (OUTPATIENT)
Dept: INTERNAL MEDICINE CLINIC | Facility: CLINIC | Age: 24
End: 2019-12-04

## 2019-12-04 NOTE — TELEPHONE ENCOUNTER
Printed letter & had Dr. Charlene Bedolla sign. Spoke to pt & made aware letter ready. Pt states will . Letter waiting at .

## 2019-12-13 ENCOUNTER — OFFICE VISIT (OUTPATIENT)
Dept: FAMILY MEDICINE CLINIC | Facility: CLINIC | Age: 24
End: 2019-12-13
Payer: COMMERCIAL

## 2019-12-13 DIAGNOSIS — Z23 NEED FOR VACCINATION: Primary | ICD-10-CM

## 2019-12-13 PROCEDURE — 90707 MMR VACCINE SC: CPT | Performed by: NURSE PRACTITIONER

## 2019-12-13 PROCEDURE — 90471 IMMUNIZATION ADMIN: CPT | Performed by: NURSE PRACTITIONER

## 2019-12-13 NOTE — PROGRESS NOTES
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 She needed to have a repeat MMR series. Booster #1 administered her on 11/13/19. Patient tolerated Booster #2 administration well today. Immunization record printed for patient with copies.

## 2019-12-18 ENCOUNTER — TELEPHONE (OUTPATIENT)
Dept: INTERNAL MEDICINE CLINIC | Facility: CLINIC | Age: 24
End: 2019-12-18

## 2019-12-18 DIAGNOSIS — Z23 NEED FOR HEPATITIS B BOOSTER VACCINATION: Primary | ICD-10-CM

## 2019-12-18 NOTE — TELEPHONE ENCOUNTER
Patient called, she said that she needed to get a second dose of the Hep B booster. Patient had a hep b vaccine on 11/15/19. PSR scheduled pt for 12/20, its 5 weeks after the last dose. Can we please get order before Friday?     If not we need to call

## 2019-12-18 NOTE — TELEPHONE ENCOUNTER
Per 11/14/19 note, Dr Dipak Whittaker advised only 1 booster Hep B needed since pt has had series in past. Pt had that booster on 11/15/19. Spoke with pt.  Pt states school is requiring she repeat entire series, will not accept just 1 booster as recommended by

## 2019-12-20 ENCOUNTER — NURSE ONLY (OUTPATIENT)
Dept: INTERNAL MEDICINE CLINIC | Facility: CLINIC | Age: 24
End: 2019-12-20
Payer: COMMERCIAL

## 2019-12-20 DIAGNOSIS — Z23 NEED FOR HEPATITIS B BOOSTER VACCINATION: ICD-10-CM

## 2019-12-20 PROCEDURE — 90471 IMMUNIZATION ADMIN: CPT | Performed by: STUDENT IN AN ORGANIZED HEALTH CARE EDUCATION/TRAINING PROGRAM

## 2019-12-20 PROCEDURE — 90746 HEPB VACCINE 3 DOSE ADULT IM: CPT | Performed by: STUDENT IN AN ORGANIZED HEALTH CARE EDUCATION/TRAINING PROGRAM

## 2019-12-20 NOTE — PROGRESS NOTES
Pt here for 2nd dose of 3 for Hep B series. Verified name & . Verified correct medication. Administered IM to left deltoid. Pt tolerated well. Provided VIS handout. Pt to return in 8 weeks for 3rd dose.

## 2020-01-29 ENCOUNTER — HOSPITAL ENCOUNTER (OUTPATIENT)
Age: 25
Discharge: HOME | End: 2020-01-29
Attending: EMERGENCY MEDICINE
Payer: COMMERCIAL

## 2020-01-29 VITALS
SYSTOLIC BLOOD PRESSURE: 128 MMHG | DIASTOLIC BLOOD PRESSURE: 81 MMHG | RESPIRATION RATE: 18 BRPM | OXYGEN SATURATION: 98 % | TEMPERATURE: 98 F | HEART RATE: 90 BPM

## 2020-01-29 DIAGNOSIS — L20.9 ATOPIC DERMATITIS, UNSPECIFIED TYPE: Primary | ICD-10-CM

## 2020-01-29 PROCEDURE — 99214 OFFICE O/P EST MOD 30 MIN: CPT

## 2020-01-29 PROCEDURE — 99213 OFFICE O/P EST LOW 20 MIN: CPT

## 2020-01-29 RX ORDER — PREDNISONE 20 MG/1
TABLET ORAL
Qty: 18 TABLET | Refills: 0 | Status: SHIPPED | OUTPATIENT
Start: 2020-01-29 | End: 2020-05-20 | Stop reason: ALTCHOICE

## 2020-01-29 RX ORDER — HYDROXYZINE HYDROCHLORIDE 25 MG/1
TABLET, FILM COATED ORAL EVERY 4 HOURS PRN
Qty: 20 TABLET | Refills: 0 | Status: SHIPPED | OUTPATIENT
Start: 2020-01-29 | End: 2020-02-28

## 2020-01-29 NOTE — ED INITIAL ASSESSMENT (HPI)
Pt states she has been having a rash on her body since Friday. Pt states that it is on her arms, legs, chest and back. Pt c/o of itching to her skin. Noted small bumps to her skin. Pt denies any new medications or skin care.

## 2020-01-29 NOTE — ED PROVIDER NOTES
Patient Seen in: [facility]      History   Patient presents with:  Rash Skin Problem    43-year-old female presents to the immediate care for evaluation of a few day history of a pruritic rash on the arms, thighs and torso. Awake, conversive and moving all 4 extremities well. ED Course   Labs Reviewed - No data to display       Exam findings and treatment plan was discussed. MDM     Atopic dermatitis.               Disposition and Plan     Clinical Impression:  Sergey

## 2020-04-15 DIAGNOSIS — E28.2 PCOS (POLYCYSTIC OVARIAN SYNDROME): ICD-10-CM

## 2020-04-15 DIAGNOSIS — N92.6 IRREGULAR MENSES: ICD-10-CM

## 2020-04-15 RX ORDER — ETONOGESTREL AND ETHINYL ESTRADIOL 11.7; 2.7 MG/1; MG/1
INSERT, EXTENDED RELEASE VAGINAL
Qty: 3 EACH | Refills: 0 | Status: SHIPPED | OUTPATIENT
Start: 2020-04-15 | End: 2020-06-17

## 2020-04-15 NOTE — TELEPHONE ENCOUNTER
Last OV relevant to medication: 5/15/19  Last refill date: 10/28/19     #/refills: 3/1  When pt was asked to return for OV: prn  Upcoming appt/reason: none

## 2020-05-04 ENCOUNTER — LAB ENCOUNTER (OUTPATIENT)
Dept: LAB | Age: 25
End: 2020-05-04
Attending: NURSE PRACTITIONER
Payer: COMMERCIAL

## 2020-05-04 DIAGNOSIS — Z92.29 HISTORY OF MMR VACCINATION: ICD-10-CM

## 2020-05-04 PROCEDURE — 36415 COLL VENOUS BLD VENIPUNCTURE: CPT

## 2020-05-04 PROCEDURE — 86762 RUBELLA ANTIBODY: CPT

## 2020-05-04 PROCEDURE — 86735 MUMPS ANTIBODY: CPT

## 2020-05-04 PROCEDURE — 86765 RUBEOLA ANTIBODY: CPT

## 2020-05-06 NOTE — PROGRESS NOTES
Pt is wondering if she needs to repeat series. She has had 2 sets of MMR, 1 as a child and 1 as an adult. She needs immunity for nursing school.  Advised pt to check with school to see if they want her to repeat vaccine series at this time and call us to

## 2020-05-20 ENCOUNTER — OFFICE VISIT (OUTPATIENT)
Dept: INTERNAL MEDICINE CLINIC | Facility: CLINIC | Age: 25
End: 2020-05-20
Payer: COMMERCIAL

## 2020-05-20 VITALS
HEIGHT: 57.75 IN | BODY MASS INDEX: 31.41 KG/M2 | OXYGEN SATURATION: 99 % | WEIGHT: 149.63 LBS | HEART RATE: 89 BPM | DIASTOLIC BLOOD PRESSURE: 80 MMHG | TEMPERATURE: 97 F | RESPIRATION RATE: 14 BRPM | SYSTOLIC BLOOD PRESSURE: 112 MMHG

## 2020-05-20 DIAGNOSIS — Z13.0 SCREENING FOR IRON DEFICIENCY ANEMIA: ICD-10-CM

## 2020-05-20 DIAGNOSIS — Z13.1 SCREENING FOR DIABETES MELLITUS: ICD-10-CM

## 2020-05-20 DIAGNOSIS — Z23 NEED FOR VACCINATION: ICD-10-CM

## 2020-05-20 DIAGNOSIS — Z13.220 SCREENING FOR LIPOID DISORDERS: ICD-10-CM

## 2020-05-20 DIAGNOSIS — E28.2 PCOS (POLYCYSTIC OVARIAN SYNDROME): ICD-10-CM

## 2020-05-20 DIAGNOSIS — Z13.228 SCREENING FOR METABOLIC DISORDER: ICD-10-CM

## 2020-05-20 DIAGNOSIS — Z13.29 SCREENING FOR THYROID DISORDER: ICD-10-CM

## 2020-05-20 DIAGNOSIS — Z00.00 ENCOUNTER FOR ANNUAL PHYSICAL EXAM: Primary | ICD-10-CM

## 2020-05-20 PROBLEM — R53.83 OTHER FATIGUE: Status: RESOLVED | Noted: 2017-11-02 | Resolved: 2020-05-20

## 2020-05-20 PROCEDURE — 90471 IMMUNIZATION ADMIN: CPT | Performed by: STUDENT IN AN ORGANIZED HEALTH CARE EDUCATION/TRAINING PROGRAM

## 2020-05-20 PROCEDURE — 99395 PREV VISIT EST AGE 18-39: CPT | Performed by: STUDENT IN AN ORGANIZED HEALTH CARE EDUCATION/TRAINING PROGRAM

## 2020-05-20 PROCEDURE — 90746 HEPB VACCINE 3 DOSE ADULT IM: CPT | Performed by: STUDENT IN AN ORGANIZED HEALTH CARE EDUCATION/TRAINING PROGRAM

## 2020-05-20 PROCEDURE — 99213 OFFICE O/P EST LOW 20 MIN: CPT | Performed by: STUDENT IN AN ORGANIZED HEALTH CARE EDUCATION/TRAINING PROGRAM

## 2020-05-20 PROCEDURE — 3008F BODY MASS INDEX DOCD: CPT | Performed by: STUDENT IN AN ORGANIZED HEALTH CARE EDUCATION/TRAINING PROGRAM

## 2020-05-20 PROCEDURE — 3079F DIAST BP 80-89 MM HG: CPT | Performed by: STUDENT IN AN ORGANIZED HEALTH CARE EDUCATION/TRAINING PROGRAM

## 2020-05-20 PROCEDURE — 3074F SYST BP LT 130 MM HG: CPT | Performed by: STUDENT IN AN ORGANIZED HEALTH CARE EDUCATION/TRAINING PROGRAM

## 2020-05-20 RX ORDER — METFORMIN HYDROCHLORIDE 500 MG/1
500 TABLET, EXTENDED RELEASE ORAL DAILY
Qty: 90 TABLET | Refills: 1 | Status: SHIPPED | OUTPATIENT
Start: 2020-05-20 | End: 2020-11-12

## 2020-05-20 RX ORDER — ELECTROLYTES/DEXTROSE
1 SOLUTION, ORAL ORAL DAILY
COMMUNITY

## 2020-05-20 NOTE — PATIENT INSTRUCTIONS
Prevention Guidelines, Women Ages 25 to 44  Screening tests and vaccines are an important part of managing your health. A screening test is done to find possible disorders or diseases in people who don't have any symptoms.  The goal is to find a disease e Type 2 diabetes, prediabetes All women diagnosed with gestational diabetes Lifelong testing every 3 years   Type 2 diabetes All women with prediabetes Every year   Gonorrhea Sexually active women at increased risk for infection At routine exams   Hepatitis Measles, mumps, rubella (MMR) All women in this age group who have no record of these infections or vaccines 1 or 2 doses   Meningococcal Women at increased risk for infection should talk with their healthcare provider 1 or more doses   Pneumococcal conjug © 3436-3542 [employer] [address]. [address] [facility], [address]. All rights reserved. This information is not intended as a substitute for professional medical care. Always follow your healthcare professional's instructions.         Metform Do not take this medicine with any of the following medications:  · dofetilide  · certain contrast medicines given before X-rays, CT scans, MRI, or other procedures  This medicine may also interact with the following medications:  · acetazolamide  · certai · an unusual or allergic reaction to metformin, other medicines, foods, dyes, or preservatives  · pregnant or trying to get pregnant  · breast-feeding  What should I watch for while using this medicine?   Visit your doctor or health care professional for re If you are going to need surgery, a MRI, CT scan, or other procedure, tell your doctor that you are taking this medicine. You may need to stop taking this medicine before the procedure.   Wear a medical ID bracelet or chain, and carry a card that describes

## 2020-05-20 NOTE — PROGRESS NOTES
[facility]    REASON FOR VISIT:    Eamon Rahman is a 22year old female who presents for an [address]. Current Complaints: feeling well, but is concerned about her increasing weight.  She feels her diet is healthy, but s Cholesterol Screening Recommended screening varies with age, risk and gender LDL Cholesterol (mg/dL)   Date Value   05/16/2019 120 (H)       Diabetes Screening  if history of high blood pressure or other  risk factors HgbA1C (%)   Date Value   05/16/2019 Known Problems Sister    • Psychiatric Brother    • No Known Problems Maternal Grandmother    • Diabetes Maternal Grandfather    • Hypertension Maternal Grandfather    • Depression Maternal Grandfather    • Anxiety Maternal Grandfather    • Breast Cancer P 150 lb (68 kg)  11/02/17 : 148 lb (67.1 kg)    Body mass index is 31.54 kg/m². Patient's last menstrual period was 05/16/2020. Constitutional: She appears her stated age, nourished, and pleasant.  Vital signs reviewed as noted  Head: Normocephalic and thyroid disorder  Screening for metabolic disorder  Screening for diabetes mellitus  PCOS (polycystic ovarian syndrome)    Age appropriate cancer screening, labs, safety, immunizations were discussed with the patient and ordered as follows:    Nestor Mcintosh was seen ([id number])      Imaging & Consults:  HEPATITIS B VACCINE, OVER 20       Continue healthy lifestyle    Discussed use of sunscreen, wearing seatbelt, recommend regular cardiovascular and weight bearing exercise as well as a well-rounded diet.      Her 5 ye

## 2020-05-22 ENCOUNTER — LAB ENCOUNTER (OUTPATIENT)
Dept: LAB | Age: 25
End: 2020-05-22
Attending: STUDENT IN AN ORGANIZED HEALTH CARE EDUCATION/TRAINING PROGRAM
Payer: COMMERCIAL

## 2020-05-22 DIAGNOSIS — Z13.0 SCREENING FOR IRON DEFICIENCY ANEMIA: ICD-10-CM

## 2020-05-22 DIAGNOSIS — Z13.1 SCREENING FOR DIABETES MELLITUS: ICD-10-CM

## 2020-05-22 DIAGNOSIS — Z13.220 SCREENING FOR LIPOID DISORDERS: ICD-10-CM

## 2020-05-22 DIAGNOSIS — Z13.228 SCREENING FOR METABOLIC DISORDER: ICD-10-CM

## 2020-05-22 DIAGNOSIS — Z13.29 SCREENING FOR THYROID DISORDER: ICD-10-CM

## 2020-05-22 PROCEDURE — 80053 COMPREHEN METABOLIC PANEL: CPT

## 2020-05-22 PROCEDURE — 83036 HEMOGLOBIN GLYCOSYLATED A1C: CPT

## 2020-05-22 PROCEDURE — 85025 COMPLETE CBC W/AUTO DIFF WBC: CPT

## 2020-05-22 PROCEDURE — 36415 COLL VENOUS BLD VENIPUNCTURE: CPT

## 2020-05-22 PROCEDURE — 84443 ASSAY THYROID STIM HORMONE: CPT

## 2020-05-22 PROCEDURE — 80061 LIPID PANEL: CPT

## 2020-05-26 ENCOUNTER — TELEPHONE (OUTPATIENT)
Dept: INTERNAL MEDICINE CLINIC | Facility: CLINIC | Age: 25
End: 2020-05-26

## 2020-05-26 DIAGNOSIS — R79.89 ABNORMAL CBC: Primary | ICD-10-CM

## 2020-05-26 NOTE — TELEPHONE ENCOUNTER
Patient returned call regarding test results. Triage not available at time of call. Please call her back. Thank you!

## 2020-06-17 DIAGNOSIS — N92.6 IRREGULAR MENSES: ICD-10-CM

## 2020-06-17 DIAGNOSIS — E28.2 PCOS (POLYCYSTIC OVARIAN SYNDROME): ICD-10-CM

## 2020-06-18 RX ORDER — ETONOGESTREL AND ETHINYL ESTRADIOL 11.7; 2.7 MG/1; MG/1
1 INSERT, EXTENDED RELEASE VAGINAL
Qty: 3 EACH | Refills: 3 | Status: SHIPPED | OUTPATIENT
Start: 2020-06-18 | End: 2020-12-02

## 2020-06-30 DIAGNOSIS — N92.1 MENOMETRORRHAGIA: ICD-10-CM

## 2020-06-30 RX ORDER — IBUPROFEN 800 MG/1
TABLET ORAL
Qty: 21 TABLET | Refills: 0 | Status: SHIPPED | OUTPATIENT
Start: 2020-06-30

## 2020-11-05 ENCOUNTER — TELEPHONE (OUTPATIENT)
Dept: INTERNAL MEDICINE CLINIC | Facility: CLINIC | Age: 25
End: 2020-11-05

## 2020-11-05 DIAGNOSIS — Z11.1 ENCOUNTER FOR PPD SKIN TEST READING: ICD-10-CM

## 2020-11-05 DIAGNOSIS — Z11.1 ENCOUNTER FOR PPD TEST: Primary | ICD-10-CM

## 2020-11-05 NOTE — TELEPHONE ENCOUNTER
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  Pt has appt for TB Test on 11/9/2020 Please place order   Thank you

## 2020-11-09 ENCOUNTER — NURSE ONLY (OUTPATIENT)
Dept: INTERNAL MEDICINE CLINIC | Facility: CLINIC | Age: 25
End: 2020-11-09
Payer: COMMERCIAL

## 2020-11-09 PROCEDURE — 86580 TB INTRADERMAL TEST: CPT | Performed by: STUDENT IN AN ORGANIZED HEALTH CARE EDUCATION/TRAINING PROGRAM

## 2020-11-09 NOTE — PROGRESS NOTES
Pt name and  verified. Needs PPD test.  PPD placed right forearm. Pt advised to return to clinic for scheduled nurse visit within 48-72 hr window. Pt stated understandings of instructions.     Pt stated only needs letter of completed, negative TB test, n

## 2020-11-12 ENCOUNTER — NURSE ONLY (OUTPATIENT)
Dept: INTERNAL MEDICINE CLINIC | Facility: CLINIC | Age: 25
End: 2020-11-12
Payer: COMMERCIAL

## 2020-11-12 DIAGNOSIS — E28.2 PCOS (POLYCYSTIC OVARIAN SYNDROME): ICD-10-CM

## 2020-11-12 NOTE — PROGRESS NOTES
Pt had PPD placement 11/9/20, returned today for PPD read, right forearm. Negative, 0 mm induration. Letter given to pt and send via [employer].

## 2020-11-12 NOTE — TELEPHONE ENCOUNTER
Last OV relevant to medication: 5/20/2020  Last refill date: 5/20/2020    #/refills: 90-1  When pt was asked to return for OV: 6 months  Upcoming appt/reason: No future appointment scheduled    Lab Results   Component Value Date     05/22/2020    A1

## 2020-11-16 RX ORDER — METFORMIN HYDROCHLORIDE 500 MG/1
TABLET, EXTENDED RELEASE ORAL
Qty: 90 TABLET | Refills: 0 | Status: SHIPPED | OUTPATIENT
Start: 2020-11-16 | End: 2021-02-18

## 2020-12-02 ENCOUNTER — OFFICE VISIT (OUTPATIENT)
Dept: INTERNAL MEDICINE CLINIC | Facility: CLINIC | Age: 25
End: 2020-12-02
Payer: COMMERCIAL

## 2020-12-02 VITALS
OXYGEN SATURATION: 99 % | TEMPERATURE: 98 F | WEIGHT: 155.19 LBS | SYSTOLIC BLOOD PRESSURE: 114 MMHG | DIASTOLIC BLOOD PRESSURE: 76 MMHG | HEIGHT: 58 IN | BODY MASS INDEX: 32.57 KG/M2 | RESPIRATION RATE: 16 BRPM | HEART RATE: 86 BPM

## 2020-12-02 DIAGNOSIS — E28.2 PCOS (POLYCYSTIC OVARIAN SYNDROME): Primary | ICD-10-CM

## 2020-12-02 DIAGNOSIS — N92.6 IRREGULAR MENSES: ICD-10-CM

## 2020-12-02 DIAGNOSIS — L65.9 HAIR LOSS: ICD-10-CM

## 2020-12-02 PROCEDURE — 3078F DIAST BP <80 MM HG: CPT | Performed by: STUDENT IN AN ORGANIZED HEALTH CARE EDUCATION/TRAINING PROGRAM

## 2020-12-02 PROCEDURE — 3074F SYST BP LT 130 MM HG: CPT | Performed by: STUDENT IN AN ORGANIZED HEALTH CARE EDUCATION/TRAINING PROGRAM

## 2020-12-02 PROCEDURE — 99214 OFFICE O/P EST MOD 30 MIN: CPT | Performed by: STUDENT IN AN ORGANIZED HEALTH CARE EDUCATION/TRAINING PROGRAM

## 2020-12-02 PROCEDURE — 3008F BODY MASS INDEX DOCD: CPT | Performed by: STUDENT IN AN ORGANIZED HEALTH CARE EDUCATION/TRAINING PROGRAM

## 2020-12-02 RX ORDER — ETONOGESTREL AND ETHINYL ESTRADIOL 11.7; 2.7 MG/1; MG/1
1 INSERT, EXTENDED RELEASE VAGINAL
Qty: 3 EACH | Refills: 3 | Status: SHIPPED | OUTPATIENT
Start: 2020-12-02 | End: 2021-05-17

## 2020-12-02 NOTE — PROGRESS NOTES
[facility]    REASON FOR VISIT:    Current Complaints: Patient presents with: Follow - Up: weight      HPI:  Gabriel Navarro is a 26year old female who presents for 6 months f/u.     Patient was started on Metformin  mg for her eleva Hypertension Maternal Grandfather    • Depression Maternal Grandfather    • Anxiety Maternal Grandfather    • Breast Cancer Paternal Grandmother    • Dementia Paternal Grandmother    • Heart Attack Paternal Grandfather    • Diabetes Paternal Grandfather nourished, and pleasant. Vital signs reviewed as noted   Head: Normocephalic and atraumatic. HEENT: Nose normal. TMs pearly gray, + light reflex. B/l external auditory canals without erythema or edema.   Mucous membranes moist, dentition normal.  Everet Fess ring vaginally every 21 days. Medications side effects, risk and benefits discussed in length. After visit instructions are provided to the patient. The patient indicates understanding of these issues and agrees to the treatment plan.   The patient is

## 2020-12-08 ENCOUNTER — TELEPHONE (OUTPATIENT)
Dept: INTERNAL MEDICINE CLINIC | Facility: CLINIC | Age: 25
End: 2020-12-08

## 2020-12-16 ENCOUNTER — OFFICE VISIT (OUTPATIENT)
Dept: INTERNAL MEDICINE CLINIC | Facility: CLINIC | Age: 25
End: 2020-12-16
Payer: COMMERCIAL

## 2020-12-16 VITALS
RESPIRATION RATE: 16 BRPM | HEART RATE: 78 BPM | DIASTOLIC BLOOD PRESSURE: 78 MMHG | HEIGHT: 58.5 IN | WEIGHT: 155 LBS | SYSTOLIC BLOOD PRESSURE: 122 MMHG | BODY MASS INDEX: 31.67 KG/M2

## 2020-12-16 DIAGNOSIS — E55.9 VITAMIN D DEFICIENCY: ICD-10-CM

## 2020-12-16 DIAGNOSIS — E28.2 PCOS (POLYCYSTIC OVARIAN SYNDROME): Primary | ICD-10-CM

## 2020-12-16 DIAGNOSIS — E01.0 THYROMEGALY: ICD-10-CM

## 2020-12-16 DIAGNOSIS — Z83.3 FAMILY HISTORY OF DIABETES MELLITUS (DM): ICD-10-CM

## 2020-12-16 DIAGNOSIS — L83 ACANTHOSIS NIGRICANS: ICD-10-CM

## 2020-12-16 PROCEDURE — 3078F DIAST BP <80 MM HG: CPT | Performed by: INTERNAL MEDICINE

## 2020-12-16 PROCEDURE — 3074F SYST BP LT 130 MM HG: CPT | Performed by: INTERNAL MEDICINE

## 2020-12-16 PROCEDURE — 99214 OFFICE O/P EST MOD 30 MIN: CPT | Performed by: INTERNAL MEDICINE

## 2020-12-16 PROCEDURE — 93000 ELECTROCARDIOGRAM COMPLETE: CPT | Performed by: INTERNAL MEDICINE

## 2020-12-16 PROCEDURE — 3008F BODY MASS INDEX DOCD: CPT | Performed by: INTERNAL MEDICINE

## 2020-12-16 RX ORDER — PHENTERMINE HYDROCHLORIDE 15 MG/1
15 CAPSULE ORAL EVERY MORNING
Qty: 30 CAPSULE | Refills: 0 | Status: SHIPPED | OUTPATIENT
Start: 2020-12-16 | End: 2021-01-11

## 2020-12-16 NOTE — PROGRESS NOTES
HISTORY OF PRESENT ILLNESS  Patient presents with:  Weight Problem: ref by PCP, tried metformin      Riaz Parham is a 26year old female new to our office today for initiation of medical weight loss program.  Patient presents today with c/o exces History of bariatric surgery: negative     [address] reviewed: obesity in parent/s or sibling: YES     REVIEW OF SYSTEMS  GENERAL: feels well otherwise,   NECK: denies thickening   LUNGS: denies shortness of breath with exertion, no apnea   CARDIOVASCULAR: den AST 18 05/22/2020    ALT 28 05/22/2020    BILT 0.2 05/22/2020    TP 8.1 05/22/2020    ALB 3.8 05/22/2020    GLOBULIN 4.3 05/22/2020     05/22/2020    K 4.0 05/22/2020     05/22/2020    CO2 24.0 05/22/2020     Lab Results   Component Value Da REFERRAL TO DIETITIAN EM[facility] (WLC USE ONLY)    Vitamin D deficiency  -     ELECTROCARDIOGRAM, COMPLETE  -     VITAMIN D, 25-HYDROXY; Future  -     VITAMIN B12; Future  -     COMP METABOLIC PANEL (14);  Future  -     INSULIN; Future  -     TSH+FREE T4; Futu month  Schedule follow up appointments: Savannah Barr (dietitian) or Milagro Christopher (presurgery dietitian)   Check for insurance coverage for dietitian and labwork prior to scheduling appointment. Please try to work on the following dietary changes: 1. fruit options   Raspberries: Half a cup (60 grams) contains 3 grams of carbs. Blackberries: Half a cup (70 grams) contains 4 grams of carbs. Strawberries: Half a cup (100 grams) contains 6 grams of carbs.   Blueberries: Half a cup (50 grams) contains 6 gr

## 2020-12-16 NOTE — PATIENT INSTRUCTIONS
Plan:  Continue with medications:   Go to the lab for blood work   Follow up with me in 1 month  Schedule follow up appointments: Declan Rosenberg (dietitian) or Rober Ivan (presurgery dietitian)   Check for insurance coverage for dietitian and labwork pr for sodium issues)   -hummus with vegetables  -bean dip with vegetables    FRUIT  Low carb fruit options   Raspberries: Half a cup (60 grams) contains 3 grams of carbs. Blackberries: Half a cup (70 grams) contains 4 grams of carbs.   Strawberries: Half a c

## 2020-12-17 ENCOUNTER — LAB ENCOUNTER (OUTPATIENT)
Dept: LAB | Age: 25
End: 2020-12-17
Attending: INTERNAL MEDICINE
Payer: COMMERCIAL

## 2020-12-17 ENCOUNTER — PATIENT MESSAGE (OUTPATIENT)
Dept: INTERNAL MEDICINE CLINIC | Facility: CLINIC | Age: 25
End: 2020-12-17

## 2020-12-17 DIAGNOSIS — E28.2 PCOS (POLYCYSTIC OVARIAN SYNDROME): ICD-10-CM

## 2020-12-17 DIAGNOSIS — Z83.3 FAMILY HISTORY OF DIABETES MELLITUS (DM): ICD-10-CM

## 2020-12-17 DIAGNOSIS — L83 ACANTHOSIS NIGRICANS: ICD-10-CM

## 2020-12-17 DIAGNOSIS — E55.9 VITAMIN D DEFICIENCY: ICD-10-CM

## 2020-12-17 DIAGNOSIS — E01.0 THYROMEGALY: ICD-10-CM

## 2020-12-17 PROCEDURE — 82607 VITAMIN B-12: CPT

## 2020-12-17 PROCEDURE — 82306 VITAMIN D 25 HYDROXY: CPT

## 2020-12-17 PROCEDURE — 84443 ASSAY THYROID STIM HORMONE: CPT

## 2020-12-17 PROCEDURE — 84439 ASSAY OF FREE THYROXINE: CPT

## 2020-12-17 PROCEDURE — 80053 COMPREHEN METABOLIC PANEL: CPT

## 2020-12-17 PROCEDURE — 36415 COLL VENOUS BLD VENIPUNCTURE: CPT

## 2020-12-17 PROCEDURE — 83525 ASSAY OF INSULIN: CPT

## 2020-12-17 NOTE — TELEPHONE ENCOUNTER
From: Sue Castillo  To: Dianna Smith MD  Sent: 12/17/2020 2:08 PM CST  Subject: Other    Bogdan Rodriguez,    Dr. Joni Shaver requested that I obtain a thyroid ultrasound but my insurance will only cover the procedure if it is done at a [facility] facility.  I belie

## 2020-12-24 ENCOUNTER — TELEPHONE (OUTPATIENT)
Dept: INTERNAL MEDICINE CLINIC | Facility: CLINIC | Age: 25
End: 2020-12-24

## 2020-12-28 ENCOUNTER — NURSE ONLY (OUTPATIENT)
Dept: INTERNAL MEDICINE CLINIC | Facility: CLINIC | Age: 25
End: 2020-12-28
Payer: COMMERCIAL

## 2020-12-28 DIAGNOSIS — E53.8 B12 DEFICIENCY: Primary | ICD-10-CM

## 2020-12-30 PROCEDURE — 96372 THER/PROPH/DIAG INJ SC/IM: CPT | Performed by: INTERNAL MEDICINE

## 2020-12-30 RX ORDER — CYANOCOBALAMIN 1000 UG/ML
1000 INJECTION INTRAMUSCULAR; SUBCUTANEOUS ONCE
Status: COMPLETED | OUTPATIENT
Start: 2020-12-30 | End: 2020-12-30

## 2020-12-30 RX ADMIN — CYANOCOBALAMIN 1000 MCG: 1000 INJECTION INTRAMUSCULAR; SUBCUTANEOUS at 13:23:00

## 2021-01-12 RX ORDER — PHENTERMINE HYDROCHLORIDE 15 MG/1
15 CAPSULE ORAL EVERY MORNING
Qty: 30 CAPSULE | Refills: 0 | Status: SHIPPED | OUTPATIENT
Start: 2021-01-12 | End: 2021-02-03

## 2021-01-12 NOTE — TELEPHONE ENCOUNTER
Requesting Phentermine  LOV: 12/16/20  RTC: one month  Last Relevant Labs: na  Filled: 12/16/20 #30 with 0 refills  [place] 12[place]16/20 [place]    Future Appointments   Date Time Provider Adrianna Horn   2/3/2021 11:40 AM Cliff Moran MD EM

## 2021-01-27 ENCOUNTER — NURSE ONLY (OUTPATIENT)
Dept: INTERNAL MEDICINE CLINIC | Facility: CLINIC | Age: 26
End: 2021-01-27
Payer: COMMERCIAL

## 2021-01-27 DIAGNOSIS — E53.8 B12 DEFICIENCY: Primary | ICD-10-CM

## 2021-01-28 PROCEDURE — 96372 THER/PROPH/DIAG INJ SC/IM: CPT | Performed by: INTERNAL MEDICINE

## 2021-01-28 RX ORDER — CYANOCOBALAMIN 1000 UG/ML
1000 INJECTION INTRAMUSCULAR; SUBCUTANEOUS ONCE
Status: COMPLETED | OUTPATIENT
Start: 2021-01-28 | End: 2021-01-28

## 2021-01-28 RX ADMIN — CYANOCOBALAMIN 1000 MCG: 1000 INJECTION INTRAMUSCULAR; SUBCUTANEOUS at 10:35:00

## 2021-02-03 ENCOUNTER — OFFICE VISIT (OUTPATIENT)
Dept: INTERNAL MEDICINE CLINIC | Facility: CLINIC | Age: 26
End: 2021-02-03
Payer: COMMERCIAL

## 2021-02-03 VITALS
BODY MASS INDEX: 29.62 KG/M2 | WEIGHT: 145 LBS | HEART RATE: 100 BPM | DIASTOLIC BLOOD PRESSURE: 78 MMHG | HEIGHT: 58.5 IN | SYSTOLIC BLOOD PRESSURE: 120 MMHG | RESPIRATION RATE: 16 BRPM

## 2021-02-03 DIAGNOSIS — F41.9 ANXIETY: ICD-10-CM

## 2021-02-03 DIAGNOSIS — E53.8 B12 DEFICIENCY: ICD-10-CM

## 2021-02-03 DIAGNOSIS — Z51.81 THERAPEUTIC DRUG MONITORING: Primary | ICD-10-CM

## 2021-02-03 DIAGNOSIS — E28.2 PCOS (POLYCYSTIC OVARIAN SYNDROME): ICD-10-CM

## 2021-02-03 PROCEDURE — 3078F DIAST BP <80 MM HG: CPT | Performed by: INTERNAL MEDICINE

## 2021-02-03 PROCEDURE — 3008F BODY MASS INDEX DOCD: CPT | Performed by: INTERNAL MEDICINE

## 2021-02-03 PROCEDURE — 99214 OFFICE O/P EST MOD 30 MIN: CPT | Performed by: INTERNAL MEDICINE

## 2021-02-03 PROCEDURE — 3074F SYST BP LT 130 MM HG: CPT | Performed by: INTERNAL MEDICINE

## 2021-02-03 RX ORDER — PHENTERMINE HYDROCHLORIDE 15 MG/1
15 CAPSULE ORAL EVERY MORNING
Qty: 30 CAPSULE | Refills: 1 | Status: SHIPPED | OUTPATIENT
Start: 2021-02-03 | End: 2021-05-26

## 2021-02-03 RX ORDER — CYANOCOBALAMIN 1000 UG/ML
1000 INJECTION INTRAMUSCULAR; SUBCUTANEOUS ONCE
Status: CANCELLED | OUTPATIENT
Start: 2021-02-03 | End: 2021-02-03

## 2021-02-03 NOTE — PROGRESS NOTES
HISTORY OF PRESENT ILLNESS  Patient presents with:  Weight Check: down 10 lb      Michelle Duong is a 26year old female here for follow up in medical weight loss program.     Denies chest pain, shortness of breath, dizziness, blurred vision, headac GFRAA 93 12/17/2020    CA 9.8 12/17/2020    OSMOCALC 283 12/17/2020    ALKPHO 72 12/17/2020    AST 23 12/17/2020    ALT 31 12/17/2020    BILT 0.2 12/17/2020    TP 8.3 (H) 12/17/2020    ALB 3.8 12/17/2020    GLOBULIN 4.5 (H) 12/17/2020     12/17/20 morning.     Anxiety    PCOS (polycystic ovarian syndrome)    Other orders  -     Cancel: cyanocobalamin (VITAMIN B12) 1000 MCG/ML injection 1,000 mcg        PLAN  · Initial consult: 12/16/20: 155 lb   · Down 10 lb   · Continue phentermine 15 mgq day   · -a

## 2021-02-18 DIAGNOSIS — E28.2 PCOS (POLYCYSTIC OVARIAN SYNDROME): ICD-10-CM

## 2021-02-19 NOTE — TELEPHONE ENCOUNTER
Last refill #90 on 12/16/2020  Last office visit pertaining to refill on 12/2/2020  Future Appointments   Date Time Provider Adrianna Horn   2/22/2021  9:00 AM  [facility]   3/25/2021  1:00 PM Joann Schmidt MD [facility]

## 2021-02-22 ENCOUNTER — NURSE ONLY (OUTPATIENT)
Dept: INTERNAL MEDICINE CLINIC | Facility: CLINIC | Age: 26
End: 2021-02-22
Payer: COMMERCIAL

## 2021-02-22 DIAGNOSIS — E53.8 B12 DEFICIENCY: Primary | ICD-10-CM

## 2021-02-22 PROCEDURE — 96372 THER/PROPH/DIAG INJ SC/IM: CPT | Performed by: NURSE PRACTITIONER

## 2021-02-22 RX ORDER — CYANOCOBALAMIN 1000 UG/ML
1000 INJECTION INTRAMUSCULAR; SUBCUTANEOUS ONCE
Status: COMPLETED | OUTPATIENT
Start: 2021-02-22 | End: 2021-02-22

## 2021-02-22 RX ADMIN — CYANOCOBALAMIN 1000 MCG: 1000 INJECTION INTRAMUSCULAR; SUBCUTANEOUS at 11:02:00

## 2021-02-24 ENCOUNTER — TELEPHONE (OUTPATIENT)
Dept: INTERNAL MEDICINE CLINIC | Facility: CLINIC | Age: 26
End: 2021-02-24

## 2021-03-01 DIAGNOSIS — N92.6 IRREGULAR MENSES: ICD-10-CM

## 2021-03-01 DIAGNOSIS — E28.2 PCOS (POLYCYSTIC OVARIAN SYNDROME): ICD-10-CM

## 2021-03-01 RX ORDER — METFORMIN HYDROCHLORIDE 500 MG/1
500 TABLET, EXTENDED RELEASE ORAL DAILY
Qty: 90 TABLET | Refills: 0 | Status: SHIPPED | OUTPATIENT
Start: 2021-03-01

## 2021-03-03 RX ORDER — METFORMIN HYDROCHLORIDE 500 MG/1
500 TABLET, EXTENDED RELEASE ORAL DAILY
Qty: 90 TABLET | Refills: 0 | Status: CANCELLED | OUTPATIENT
Start: 2021-03-03

## 2021-03-03 RX ORDER — ETONOGESTREL AND ETHINYL ESTRADIOL 11.7; 2.7 MG/1; MG/1
1 INSERT, EXTENDED RELEASE VAGINAL
Qty: 3 EACH | Refills: 3 | OUTPATIENT
Start: 2021-03-03

## 2021-03-03 NOTE — TELEPHONE ENCOUNTER
Noted Dr. Abdirahman Pope approved medication. Overdue A1C & Microalbumin per protocol is not applicable. Metformin is not being used for DM. Noted associated diagnosis of PCOS.     Informed pt

## 2021-03-04 ENCOUNTER — TELEPHONE (OUTPATIENT)
Dept: INTERNAL MEDICINE CLINIC | Facility: CLINIC | Age: 26
End: 2021-03-04

## 2021-03-15 DIAGNOSIS — Z23 NEED FOR VACCINATION: ICD-10-CM

## 2021-03-15 RX ORDER — PHENTERMINE HYDROCHLORIDE 15 MG/1
15 CAPSULE ORAL EVERY MORNING
Qty: 30 CAPSULE | Refills: 1 | Status: CANCELLED | OUTPATIENT
Start: 2021-03-15

## 2021-03-15 NOTE — TELEPHONE ENCOUNTER
Requesting Phentermine 15 mg  LOV: 2/3/21  RTC: one month  Last Relevant Labs: na  Filled: 2/3/21 #30 with 1 refill last filled 2/9/21 [place]    Future Appointments   Date Time Provider Adrianna Horn   4/15/2021  1:40 PM John Sparks MD [facility]

## 2021-03-23 ENCOUNTER — NURSE ONLY (OUTPATIENT)
Dept: INTERNAL MEDICINE CLINIC | Facility: CLINIC | Age: 26
End: 2021-03-23
Payer: COMMERCIAL

## 2021-03-23 DIAGNOSIS — E53.8 B12 DEFICIENCY: Primary | ICD-10-CM

## 2021-03-23 PROCEDURE — 96372 THER/PROPH/DIAG INJ SC/IM: CPT | Performed by: INTERNAL MEDICINE

## 2021-03-23 RX ORDER — CYANOCOBALAMIN 1000 UG/ML
1000 INJECTION INTRAMUSCULAR; SUBCUTANEOUS ONCE
Status: COMPLETED | OUTPATIENT
Start: 2021-03-23 | End: 2021-03-23

## 2021-03-23 RX ADMIN — CYANOCOBALAMIN 1000 MCG: 1000 INJECTION INTRAMUSCULAR; SUBCUTANEOUS at 16:24:00

## 2021-04-15 ENCOUNTER — TELEMEDICINE (OUTPATIENT)
Dept: INTERNAL MEDICINE CLINIC | Facility: CLINIC | Age: 26
End: 2021-04-15

## 2021-04-15 DIAGNOSIS — Z51.81 THERAPEUTIC DRUG MONITORING: Primary | ICD-10-CM

## 2021-04-15 PROCEDURE — 99213 OFFICE O/P EST LOW 20 MIN: CPT | Performed by: INTERNAL MEDICINE

## 2021-04-15 RX ORDER — PHENTERMINE HYDROCHLORIDE 15 MG/1
15 CAPSULE ORAL 2 TIMES DAILY
Qty: 60 CAPSULE | Refills: 1 | Status: SHIPPED | OUTPATIENT
Start: 2021-04-15 | End: 2021-06-10

## 2021-04-15 RX ORDER — PHENTERMINE HYDROCHLORIDE 15 MG/1
15 CAPSULE ORAL EVERY MORNING
Qty: 30 CAPSULE | Refills: 1 | Status: CANCELLED | OUTPATIENT
Start: 2021-04-15

## 2021-04-15 NOTE — PROGRESS NOTES
HISTORY OF PRESENT ILLNESS  Patient presents with:  Weight Check: Jamel Whittington is a 26year old female here for follow up in medical weight loss program.     Denies chest pain, shortness of breath, dizziness, blurred vision, headache, p ANIONGAP 9 12/17/2020    GFR 96 11/02/2017    GFRNAA 81 12/17/2020    GFRAA 93 12/17/2020    CA 9.8 12/17/2020    OSMOCALC 283 12/17/2020    ALKPHO 72 12/17/2020    AST 23 12/17/2020    ALT 31 12/17/2020    BILT 0.2 12/17/2020    TP 8.3 (H) 12/17/2020    A drug monitoring    BMI 32.0-32.9,adult    Other orders  -     Cancel: Phentermine HCl 15 MG Oral Cap; Take 1 capsule (15 mg total) by mouth every morning.  -     Phentermine HCl 15 MG Oral Cap; Take 1 capsule (15 mg total) by mouth 2 (two) times a day.

## 2021-05-07 ENCOUNTER — NURSE ONLY (OUTPATIENT)
Dept: INTERNAL MEDICINE CLINIC | Facility: CLINIC | Age: 26
End: 2021-05-07
Payer: COMMERCIAL

## 2021-05-07 DIAGNOSIS — E53.8 B12 DEFICIENCY: Primary | ICD-10-CM

## 2021-05-07 PROCEDURE — 96372 THER/PROPH/DIAG INJ SC/IM: CPT | Performed by: PHYSICIAN ASSISTANT

## 2021-05-07 RX ORDER — CYANOCOBALAMIN 1000 UG/ML
1000 INJECTION INTRAMUSCULAR; SUBCUTANEOUS ONCE
Status: COMPLETED | OUTPATIENT
Start: 2021-05-07 | End: 2021-05-07

## 2021-05-07 RX ADMIN — CYANOCOBALAMIN 1000 MCG: 1000 INJECTION INTRAMUSCULAR; SUBCUTANEOUS at 10:07:00

## 2021-05-17 DIAGNOSIS — N92.6 IRREGULAR MENSES: ICD-10-CM

## 2021-05-17 DIAGNOSIS — E28.2 PCOS (POLYCYSTIC OVARIAN SYNDROME): ICD-10-CM

## 2021-05-18 RX ORDER — ETONOGESTREL AND ETHINYL ESTRADIOL 11.7; 2.7 MG/1; MG/1
1 INSERT, EXTENDED RELEASE VAGINAL
Qty: 3 EACH | Refills: 0 | Status: SHIPPED | OUTPATIENT
Start: 2021-05-18 | End: 2021-08-16

## 2021-05-26 ENCOUNTER — OFFICE VISIT (OUTPATIENT)
Dept: INTERNAL MEDICINE CLINIC | Facility: CLINIC | Age: 26
End: 2021-05-26
Payer: COMMERCIAL

## 2021-05-26 VITALS
SYSTOLIC BLOOD PRESSURE: 110 MMHG | HEART RATE: 107 BPM | OXYGEN SATURATION: 98 % | HEIGHT: 58.25 IN | BODY MASS INDEX: 27.79 KG/M2 | TEMPERATURE: 98 F | WEIGHT: 134.19 LBS | DIASTOLIC BLOOD PRESSURE: 74 MMHG | RESPIRATION RATE: 14 BRPM

## 2021-05-26 DIAGNOSIS — Z13.0 SCREENING FOR IRON DEFICIENCY ANEMIA: ICD-10-CM

## 2021-05-26 DIAGNOSIS — Z13.220 SCREENING FOR LIPOID DISORDERS: ICD-10-CM

## 2021-05-26 DIAGNOSIS — J32.9 CHRONIC CONGESTION OF PARANASAL SINUS: ICD-10-CM

## 2021-05-26 DIAGNOSIS — Z00.00 ENCOUNTER FOR ANNUAL PHYSICAL EXAM: Primary | ICD-10-CM

## 2021-05-26 PROCEDURE — 3078F DIAST BP <80 MM HG: CPT | Performed by: STUDENT IN AN ORGANIZED HEALTH CARE EDUCATION/TRAINING PROGRAM

## 2021-05-26 PROCEDURE — 3008F BODY MASS INDEX DOCD: CPT | Performed by: STUDENT IN AN ORGANIZED HEALTH CARE EDUCATION/TRAINING PROGRAM

## 2021-05-26 PROCEDURE — 99395 PREV VISIT EST AGE 18-39: CPT | Performed by: STUDENT IN AN ORGANIZED HEALTH CARE EDUCATION/TRAINING PROGRAM

## 2021-05-26 PROCEDURE — 3074F SYST BP LT 130 MM HG: CPT | Performed by: STUDENT IN AN ORGANIZED HEALTH CARE EDUCATION/TRAINING PROGRAM

## 2021-05-26 NOTE — PROGRESS NOTES
[facility]    REASON FOR VISIT:    Rui Hogue is a 26year old female who presents for an [address]. Current Complaints:  Complains of chronic nasal congestion and sinus pressure. Symptoms of sinus headaches as well. Obesity Screening Screen all adults annually Body mass index is 27.81 kg/m².       Preventive Services for Which Recommendations Vary with Risk Recommendation Internal Lab or Procedure External Lab or Procedure   Cholesterol Screening Recommended screenin MEDICAL INFORMATION:   Past Medical History:   Diagnosis Date   • Patient denies medical problems       Past Surgical History:   Procedure Laterality Date   • PATIENT DENIES ANY SURGICAL HISTORY        Family History   Problem Relation Age of Onset   • N 110/74 (BP Location: Left arm, Patient Position: Sitting, Cuff Size: adult)   Pulse 107   Temp 98.4 °F (36.9 °C) (Temporal)   Resp 14   Ht 4' 10.25\" (1.48 m)   Wt 134 lb 3.2 oz (60.9 kg)   LMP 05/14/2021   SpO2 98%   BMI 27.81 kg/m²      Wt Readings from Skin: Skin is warm. No rash noted. No erythema.    Psychiatric: She has a normal mood and affect and her behavior is normal.     ASSESSMENT AND PLAN WITH OTHER RELEVANT CHRONIC CONDITIONS   Presleydesiree Hagan is a 26year old female who presents for an education done. Educated by: MD   The patient indicates understanding of these issues and agrees to the plan.     Diet counseling perfomed  Exercise counseling perfomed    SUGGESTED VACCINATIONS - Influenza, Pneumococcal, Zoster, Tetanus     Immunizatio

## 2021-06-01 ENCOUNTER — PATIENT MESSAGE (OUTPATIENT)
Dept: INTERNAL MEDICINE CLINIC | Facility: CLINIC | Age: 26
End: 2021-06-01

## 2021-06-02 NOTE — TELEPHONE ENCOUNTER
From: Shae Credit  To: Ant Tamayo MD  Sent: 6/1/2021 4:39 PM CDT  Subject: Other    Hello,    I got Dr. Bety Garcia letter from [facility] back asking me to follow up with the labs she requested.  Would you be able to fax that order to the [address] [place] [address]

## 2021-06-03 ENCOUNTER — TELEPHONE (OUTPATIENT)
Dept: INTERNAL MEDICINE CLINIC | Facility: CLINIC | Age: 26
End: 2021-06-03

## 2021-06-03 NOTE — TELEPHONE ENCOUNTER
Incoming (mail or fax):  fax  Received from:  [facility]  Documentation given to:  Triage - Dr Jael taylor

## 2021-06-04 NOTE — TELEPHONE ENCOUNTER
Received results for the following tests: CMP, CBC, FLP completed on 6/2/21 at [facility]  Updated Ext Result Console as able. To Dr. Eduardo Jansen for review. Noted Dr. Eduardo Jansen placed orders for CBC & FLP on 5/26/21.  CMP was ordered by Dr. Amanda Argueta

## 2021-06-10 ENCOUNTER — TELEMEDICINE (OUTPATIENT)
Dept: INTERNAL MEDICINE CLINIC | Facility: CLINIC | Age: 26
End: 2021-06-10

## 2021-06-10 DIAGNOSIS — Z51.81 THERAPEUTIC DRUG MONITORING: Primary | ICD-10-CM

## 2021-06-10 PROCEDURE — 99213 OFFICE O/P EST LOW 20 MIN: CPT | Performed by: INTERNAL MEDICINE

## 2021-06-10 RX ORDER — PHENTERMINE HYDROCHLORIDE 37.5 MG/1
37.5 TABLET ORAL
Qty: 30 TABLET | Refills: 1 | Status: SHIPPED | OUTPATIENT
Start: 2021-06-10 | End: 2021-08-17

## 2021-06-10 NOTE — PROGRESS NOTES
HISTORY OF PRESENT ILLNESS  Patient presents with:  Weight Check: video      Donis Hammans is a 26year old female here for follow up in medical weight loss program.     Denies chest pain, shortness of breath, dizziness, blurred vision, headache, p ANIONGAP 9 12/17/2020    GFR 88 06/02/2021    GFRNAA 81 12/17/2020    GFRAA 93 12/17/2020    CA 9.8 12/17/2020    OSMOCALC 283 12/17/2020    ALKPHO 72 12/17/2020    AST 18 06/02/2021    ALT 14 06/02/2021    BILT 0.2 12/17/2020    TP 8.3 (H) 12/17/2020    A total) by mouth every morning before breakfast.        PLAN  · Initial consult: 12/16/20: 155 lb   · Down to 134 lb   · Continue phentermine, increase to 37.5 mg q day   · -advised of side effects and adverse effects of this medication  · Reviewed meal ofelia

## 2021-06-15 NOTE — TELEPHONE ENCOUNTER
Rec'd Dr. Ernie Riggs signed fax for labs from [facility]. Sent to scan.

## 2021-06-18 ENCOUNTER — MED REC SCAN ONLY (OUTPATIENT)
Dept: INTERNAL MEDICINE CLINIC | Facility: CLINIC | Age: 26
End: 2021-06-18

## 2021-06-25 ENCOUNTER — PATIENT MESSAGE (OUTPATIENT)
Dept: INTERNAL MEDICINE CLINIC | Facility: CLINIC | Age: 26
End: 2021-06-25

## 2021-06-25 NOTE — TELEPHONE ENCOUNTER
From: Kaylyn Rodriguez  To: Richar Leiva MD  Sent: 6/25/2021 1:58 PM CDT  Subject: Other    Hello,    I got Dr. Tito Slade letter about missing labs (CBC and lipid panel). I had the labs done at [place] [address] in [place] on 6/2/21.  The result

## 2021-08-15 DIAGNOSIS — N92.6 IRREGULAR MENSES: ICD-10-CM

## 2021-08-15 DIAGNOSIS — E28.2 PCOS (POLYCYSTIC OVARIAN SYNDROME): ICD-10-CM

## 2021-08-16 RX ORDER — ETONOGESTREL AND ETHINYL ESTRADIOL .12; .015 MG/D; MG/D
RING VAGINAL
Qty: 3 RING | Refills: 2 | Status: SHIPPED | OUTPATIENT
Start: 2021-08-16 | End: 2022-01-17

## 2021-08-16 NOTE — TELEPHONE ENCOUNTER
Last OV relevant to medication: 5/26/21  Last refill date: 5/18/21     #/refills: 3/0  When pt was asked to return for OV: 1 year  Upcoming appt/reason: none  Was pt informed of any over due labs: none

## 2021-08-17 ENCOUNTER — OFFICE VISIT (OUTPATIENT)
Dept: INTERNAL MEDICINE CLINIC | Facility: CLINIC | Age: 26
End: 2021-08-17
Payer: COMMERCIAL

## 2021-08-17 VITALS
HEIGHT: 58.5 IN | BODY MASS INDEX: 25.34 KG/M2 | WEIGHT: 124 LBS | HEART RATE: 84 BPM | DIASTOLIC BLOOD PRESSURE: 78 MMHG | SYSTOLIC BLOOD PRESSURE: 118 MMHG | RESPIRATION RATE: 16 BRPM

## 2021-08-17 DIAGNOSIS — E28.2 PCOS (POLYCYSTIC OVARIAN SYNDROME): ICD-10-CM

## 2021-08-17 DIAGNOSIS — Z51.81 THERAPEUTIC DRUG MONITORING: Primary | ICD-10-CM

## 2021-08-17 DIAGNOSIS — E53.8 B12 DEFICIENCY: ICD-10-CM

## 2021-08-17 PROCEDURE — 96372 THER/PROPH/DIAG INJ SC/IM: CPT | Performed by: INTERNAL MEDICINE

## 2021-08-17 PROCEDURE — 3008F BODY MASS INDEX DOCD: CPT | Performed by: INTERNAL MEDICINE

## 2021-08-17 PROCEDURE — 3074F SYST BP LT 130 MM HG: CPT | Performed by: INTERNAL MEDICINE

## 2021-08-17 PROCEDURE — 3078F DIAST BP <80 MM HG: CPT | Performed by: INTERNAL MEDICINE

## 2021-08-17 PROCEDURE — 99213 OFFICE O/P EST LOW 20 MIN: CPT | Performed by: INTERNAL MEDICINE

## 2021-08-17 RX ORDER — PHENTERMINE HYDROCHLORIDE 37.5 MG/1
37.5 TABLET ORAL
Qty: 30 TABLET | Refills: 1 | Status: SHIPPED | OUTPATIENT
Start: 2021-08-17 | End: 2021-10-21

## 2021-08-17 RX ORDER — CYANOCOBALAMIN 1000 UG/ML
1000 INJECTION INTRAMUSCULAR; SUBCUTANEOUS ONCE
Status: COMPLETED | OUTPATIENT
Start: 2021-08-17 | End: 2021-08-17

## 2021-08-17 RX ADMIN — CYANOCOBALAMIN 1000 MCG: 1000 INJECTION INTRAMUSCULAR; SUBCUTANEOUS at 10:36:00

## 2021-08-17 NOTE — PROGRESS NOTES
HISTORY OF PRESENT ILLNESS  Patient presents with:  Weight Check: down 21 lb      Rodrick Anderson is a 23year old female here for follow up in medical weight loss program.     Denies chest pain, shortness of breath, dizziness, blurred vision, headac RDW 14.5 05/22/2020     06/02/2021     Lab Results   Component Value Date    GLU 84 12/17/2020    BUN 13 12/17/2020    BUNCREA 13.3 12/17/2020    CREATSERUM 0.80 06/02/2021    ANIONGAP 9 12/17/2020    GFR 88 06/02/2021    GFRNAA 81 12/17/2020    GFR orders for this visit:    Therapeutic drug monitoring  -     Phentermine HCl 37.5 MG Oral Tab;  Take 1 tablet (37.5 mg total) by mouth every morning before breakfast.  -     cyanocobalamin (VITAMIN B12) 1000 MCG/ML injection 1,000 mcg    BMI 32.0-32.9,adult file.    Patient verbalizes understanding.     Ney Christianson MD

## 2021-09-23 ENCOUNTER — HOSPITAL ENCOUNTER (OUTPATIENT)
Age: 26
Discharge: HOME | End: 2021-09-23
Payer: COMMERCIAL

## 2021-09-23 VITALS
RESPIRATION RATE: 18 BRPM | SYSTOLIC BLOOD PRESSURE: 126 MMHG | OXYGEN SATURATION: 100 % | TEMPERATURE: 97 F | HEIGHT: 59 IN | WEIGHT: 125 LBS | DIASTOLIC BLOOD PRESSURE: 78 MMHG | BODY MASS INDEX: 25.2 KG/M2 | HEART RATE: 80 BPM

## 2021-09-23 DIAGNOSIS — L03.012 PARONYCHIA OF LEFT THUMB: Primary | ICD-10-CM

## 2021-09-23 PROCEDURE — 99213 OFFICE O/P EST LOW 20 MIN: CPT | Performed by: PHYSICIAN ASSISTANT

## 2021-09-23 NOTE — ED INITIAL ASSESSMENT (HPI)
Left thumb is swollen and painful. Swelling started two days ago and continues to worsen. Denies injury.

## 2021-09-23 NOTE — ED PROVIDER NOTES
Patient Seen in: Immediate [address]      History   Patient presents with:  Cellulitis    Stated Complaint: Arm or Hand Injury - Left thumb is swollen and painful.  Swelling started two da*    Subjective:   HPI    CHIEF COMPLAINT: Left thumb s signs reviewed. All other systems reviewed and negative except as noted above.     Physical Exam     ED Triage Vitals [09/23/21 1223]   /78   Pulse 80   Resp 18   Temp 97.1 °F (36.2 °C)   Temp src Temporal   SpO2 100 %   O2 Device        Current: worsen          Medications Prescribed:  Current Discharge Medication List

## 2021-10-21 ENCOUNTER — OFFICE VISIT (OUTPATIENT)
Dept: INTERNAL MEDICINE CLINIC | Facility: CLINIC | Age: 26
End: 2021-10-21
Payer: COMMERCIAL

## 2021-10-21 VITALS
DIASTOLIC BLOOD PRESSURE: 76 MMHG | SYSTOLIC BLOOD PRESSURE: 122 MMHG | HEART RATE: 88 BPM | BODY MASS INDEX: 26.97 KG/M2 | WEIGHT: 132 LBS | HEIGHT: 58.5 IN | RESPIRATION RATE: 16 BRPM

## 2021-10-21 DIAGNOSIS — E53.8 B12 DEFICIENCY: ICD-10-CM

## 2021-10-21 DIAGNOSIS — E28.2 PCOS (POLYCYSTIC OVARIAN SYNDROME): ICD-10-CM

## 2021-10-21 DIAGNOSIS — Z51.81 THERAPEUTIC DRUG MONITORING: ICD-10-CM

## 2021-10-21 PROCEDURE — 3078F DIAST BP <80 MM HG: CPT | Performed by: INTERNAL MEDICINE

## 2021-10-21 PROCEDURE — 3074F SYST BP LT 130 MM HG: CPT | Performed by: INTERNAL MEDICINE

## 2021-10-21 PROCEDURE — 3008F BODY MASS INDEX DOCD: CPT | Performed by: INTERNAL MEDICINE

## 2021-10-21 PROCEDURE — 99213 OFFICE O/P EST LOW 20 MIN: CPT | Performed by: INTERNAL MEDICINE

## 2021-10-21 RX ORDER — PHENTERMINE HYDROCHLORIDE 37.5 MG/1
37.5 TABLET ORAL
Qty: 30 TABLET | Refills: 1 | Status: SHIPPED | OUTPATIENT
Start: 2021-10-21

## 2021-10-21 NOTE — PROGRESS NOTES
HISTORY OF PRESENT ILLNESS  Patient presents with:  Weight Check: up 8 lb      Shabbir Fuller is a 23year old female here for follow up in medical weight loss program.     Denies chest pain, shortness of breath, dizziness, blurred vision, headache, GFR 88 06/02/2021    GFRNAA 81 12/17/2020    GFRAA 93 12/17/2020    CA 9.8 12/17/2020    OSMOCALC 283 12/17/2020    ALKPHO 72 12/17/2020    AST 18 06/02/2021    ALT 14 06/02/2021    BILT 0.2 12/17/2020    TP 8.3 (H) 12/17/2020    ALB 3.8 12/17/2020    G Phentermine HCl 37.5 MG Oral Tab; Take 1 tablet (37.5 mg total) by mouth every morning before breakfast.    PCOS (polycystic ovarian syndrome)  -     Phentermine HCl 37.5 MG Oral Tab;  Take 1 tablet (37.5 mg total) by mouth every morning before breakfast.

## 2021-12-30 ENCOUNTER — OFFICE VISIT (OUTPATIENT)
Dept: INTERNAL MEDICINE CLINIC | Facility: CLINIC | Age: 26
End: 2021-12-30
Payer: COMMERCIAL

## 2021-12-30 VITALS
DIASTOLIC BLOOD PRESSURE: 68 MMHG | HEART RATE: 78 BPM | RESPIRATION RATE: 16 BRPM | BODY MASS INDEX: 27.17 KG/M2 | WEIGHT: 133 LBS | HEIGHT: 58.5 IN | SYSTOLIC BLOOD PRESSURE: 120 MMHG

## 2021-12-30 DIAGNOSIS — Z51.81 THERAPEUTIC DRUG MONITORING: Primary | ICD-10-CM

## 2021-12-30 DIAGNOSIS — E28.2 PCOS (POLYCYSTIC OVARIAN SYNDROME): ICD-10-CM

## 2021-12-30 PROCEDURE — 3008F BODY MASS INDEX DOCD: CPT | Performed by: INTERNAL MEDICINE

## 2021-12-30 PROCEDURE — 3074F SYST BP LT 130 MM HG: CPT | Performed by: INTERNAL MEDICINE

## 2021-12-30 PROCEDURE — 99213 OFFICE O/P EST LOW 20 MIN: CPT | Performed by: INTERNAL MEDICINE

## 2021-12-30 PROCEDURE — 3078F DIAST BP <80 MM HG: CPT | Performed by: INTERNAL MEDICINE

## 2021-12-30 RX ORDER — LIRAGLUTIDE 6 MG/ML
3 INJECTION, SOLUTION SUBCUTANEOUS DAILY
Qty: 5 EACH | Refills: 0 | Status: SHIPPED | OUTPATIENT
Start: 2021-12-30 | End: 2022-01-27

## 2021-12-30 RX ORDER — PEN NEEDLE, DIABETIC 30 GX3/16"
1 NEEDLE, DISPOSABLE MISCELLANEOUS DAILY
Qty: 90 EACH | Refills: 2 | Status: SHIPPED | OUTPATIENT
Start: 2021-12-30 | End: 2022-01-29

## 2021-12-30 NOTE — PATIENT INSTRUCTIONS
Saxenda dosing     Week 1- 0.6mg daily. Week 2- 1.2mg daily. Week 3- 1.8mg daily. Week 4- 2.4mg daily. Week 5- 3mg daily.

## 2021-12-30 NOTE — PROGRESS NOTES
HISTORY OF PRESENT ILLNESS  Patient presents with:  Weight Check: up 1 lb      Rui Hogue is a 23year old female here for follow up in medical weight loss program.     Denies chest pain, shortness of breath, dizziness, blurred vision, headache, AST 18 06/02/2021    ALT 14 06/02/2021    BILT 0.2 12/17/2020    TP 8.3 (H) 12/17/2020    ALB 3.8 12/17/2020    GLOBULIN 4.5 (H) 12/17/2020     12/17/2020    K 4.1 06/02/2021     12/17/2020    CO2 22.0 12/17/2020     Lab Results   Component [place] previous  · Trial of saxenda  · -advised of side effects and adverse effects of this medication  · Nutrition: low carb diet/ recommended to eat breakfast daily/ regular protein intake  · Medication use and side effects reviewed with patient.   Medication co

## 2022-01-15 DIAGNOSIS — N92.6 IRREGULAR MENSES: ICD-10-CM

## 2022-01-15 DIAGNOSIS — E28.2 PCOS (POLYCYSTIC OVARIAN SYNDROME): ICD-10-CM

## 2022-01-16 DIAGNOSIS — N92.6 IRREGULAR MENSES: ICD-10-CM

## 2022-01-16 DIAGNOSIS — E28.2 PCOS (POLYCYSTIC OVARIAN SYNDROME): ICD-10-CM

## 2022-01-16 RX ORDER — ETONOGESTREL AND ETHINYL ESTRADIOL 11.7; 2.7 MG/1; MG/1
1 INSERT, EXTENDED RELEASE VAGINAL
Qty: 3 RING | Refills: 1 | OUTPATIENT
Start: 2022-01-16

## 2022-01-16 NOTE — TELEPHONE ENCOUNTER
Last OV relevant to medication: 5/26/21  Last refill date: 8/16/21     #/refills: 3/2  When pt was asked to return for OV: 1 year  Upcoming appt/reason: none  Was pt informed of any over due labs: none    Rx denied, Too soon

## 2022-01-17 NOTE — TELEPHONE ENCOUNTER
See pt's MyChart message below. Recent request was denied because last refill was in August for 3 rings + 2 refills. Pt states no more refills at pharmacy. PE due in May, which PAP can be done then.     Last OV relevant to medication: 5/26/21  Last refil

## 2022-01-18 RX ORDER — ETONOGESTREL AND ETHINYL ESTRADIOL 11.7; 2.7 MG/1; MG/1
1 INSERT, EXTENDED RELEASE VAGINAL
Qty: 3 RING | Refills: 1 | Status: SHIPPED | OUTPATIENT
Start: 2022-01-18

## 2022-01-27 RX ORDER — LIRAGLUTIDE 6 MG/ML
3 INJECTION, SOLUTION SUBCUTANEOUS DAILY
Qty: 15 ML | Refills: 1 | Status: SHIPPED | OUTPATIENT
Start: 2022-01-27 | End: 2022-02-26

## 2022-01-27 NOTE — TELEPHONE ENCOUNTER
Requesting Anshu  LOV: 12/30/21  RTC: 6 weeks  Last Relevant Labs: na  Filled: 12/30/21 #5 each with 0 refills    Future Appointments   Date Time Provider Adrianna Horn   2/24/2022  2:20 PM Macho Edwards MD [facility]

## 2022-03-29 RX ORDER — LIRAGLUTIDE 6 MG/ML
3 INJECTION, SOLUTION SUBCUTANEOUS DAILY
Qty: 15 ML | Refills: 1 | Status: SHIPPED | OUTPATIENT
Start: 2022-03-29 | End: 2022-04-28

## 2022-04-27 ENCOUNTER — OFFICE VISIT (OUTPATIENT)
Dept: INTERNAL MEDICINE CLINIC | Facility: CLINIC | Age: 27
End: 2022-04-27
Payer: COMMERCIAL

## 2022-04-27 VITALS
BODY MASS INDEX: 26.15 KG/M2 | RESPIRATION RATE: 16 BRPM | WEIGHT: 128 LBS | DIASTOLIC BLOOD PRESSURE: 60 MMHG | HEART RATE: 68 BPM | HEIGHT: 58.5 IN | SYSTOLIC BLOOD PRESSURE: 122 MMHG

## 2022-04-27 DIAGNOSIS — Z51.81 THERAPEUTIC DRUG MONITORING: Primary | ICD-10-CM

## 2022-04-27 PROCEDURE — 3078F DIAST BP <80 MM HG: CPT | Performed by: INTERNAL MEDICINE

## 2022-04-27 PROCEDURE — 3008F BODY MASS INDEX DOCD: CPT | Performed by: INTERNAL MEDICINE

## 2022-04-27 PROCEDURE — 3074F SYST BP LT 130 MM HG: CPT | Performed by: INTERNAL MEDICINE

## 2022-04-27 PROCEDURE — 99213 OFFICE O/P EST LOW 20 MIN: CPT | Performed by: INTERNAL MEDICINE

## 2022-06-13 RX ORDER — LIRAGLUTIDE 6 MG/ML
3 INJECTION, SOLUTION SUBCUTANEOUS DAILY
Qty: 5 EACH | Refills: 2 | Status: SHIPPED | OUTPATIENT
Start: 2022-06-13 | End: 2022-07-13

## 2022-07-06 ENCOUNTER — OFFICE VISIT (OUTPATIENT)
Dept: INTERNAL MEDICINE CLINIC | Facility: CLINIC | Age: 27
End: 2022-07-06
Payer: COMMERCIAL

## 2022-07-06 VITALS
SYSTOLIC BLOOD PRESSURE: 98 MMHG | TEMPERATURE: 98 F | HEIGHT: 59 IN | HEART RATE: 102 BPM | WEIGHT: 129 LBS | RESPIRATION RATE: 14 BRPM | BODY MASS INDEX: 26 KG/M2 | OXYGEN SATURATION: 98 % | DIASTOLIC BLOOD PRESSURE: 64 MMHG

## 2022-07-06 DIAGNOSIS — E28.2 PCOS (POLYCYSTIC OVARIAN SYNDROME): ICD-10-CM

## 2022-07-06 DIAGNOSIS — N92.6 IRREGULAR MENSES: ICD-10-CM

## 2022-07-06 DIAGNOSIS — Z13.220 SCREENING FOR CHOLESTEROL LEVEL: ICD-10-CM

## 2022-07-06 DIAGNOSIS — Z13.29 SCREENING FOR THYROID DISORDER: ICD-10-CM

## 2022-07-06 DIAGNOSIS — L65.9 HAIR LOSS: ICD-10-CM

## 2022-07-06 DIAGNOSIS — E55.9 VITAMIN D DEFICIENCY: ICD-10-CM

## 2022-07-06 DIAGNOSIS — Z00.00 ENCOUNTER FOR ANNUAL PHYSICAL EXAM: Primary | ICD-10-CM

## 2022-07-06 PROCEDURE — 3078F DIAST BP <80 MM HG: CPT | Performed by: NURSE PRACTITIONER

## 2022-07-06 PROCEDURE — 3008F BODY MASS INDEX DOCD: CPT | Performed by: NURSE PRACTITIONER

## 2022-07-06 PROCEDURE — 99395 PREV VISIT EST AGE 18-39: CPT | Performed by: NURSE PRACTITIONER

## 2022-07-06 PROCEDURE — 3074F SYST BP LT 130 MM HG: CPT | Performed by: NURSE PRACTITIONER

## 2022-07-06 RX ORDER — ETONOGESTREL AND ETHINYL ESTRADIOL 11.7; 2.7 MG/1; MG/1
1 INSERT, EXTENDED RELEASE VAGINAL
Qty: 3 RING | Refills: 3 | Status: SHIPPED | OUTPATIENT
Start: 2022-07-06

## 2022-07-06 NOTE — PATIENT INSTRUCTIONS
Get your labs done. You should be fasting for at least 10 hours. If you take a multivitamin with Biotin or any biotin product it should be held for 3 days prior to getting your labs done.     COnitnue your current medications    Get your COVID booster     Follow up in 1 year or sooner as needed

## 2022-07-18 ENCOUNTER — TELEPHONE (OUTPATIENT)
Dept: INTERNAL MEDICINE CLINIC | Facility: CLINIC | Age: 27
End: 2022-07-18

## 2022-07-18 NOTE — TELEPHONE ENCOUNTER
Incoming (mail or fax):  fax  Received from:  [facility]  Documentation given to:  Results bin

## 2022-07-29 ENCOUNTER — MED REC SCAN ONLY (OUTPATIENT)
Dept: INTERNAL MEDICINE CLINIC | Facility: CLINIC | Age: 27
End: 2022-07-29

## 2022-08-21 ENCOUNTER — HOSPITAL ENCOUNTER (OUTPATIENT)
Age: 27
Discharge: HOME | End: 2022-08-21
Payer: COMMERCIAL

## 2022-08-21 VITALS
RESPIRATION RATE: 18 BRPM | BODY MASS INDEX: 25.2 KG/M2 | SYSTOLIC BLOOD PRESSURE: 105 MMHG | HEIGHT: 59 IN | TEMPERATURE: 97 F | OXYGEN SATURATION: 97 % | DIASTOLIC BLOOD PRESSURE: 67 MMHG | WEIGHT: 125 LBS | HEART RATE: 101 BPM

## 2022-08-21 DIAGNOSIS — H93.8X3 EAR FULLNESS, BILATERAL: Primary | ICD-10-CM

## 2022-08-21 DIAGNOSIS — B34.9 VIRAL SYNDROME: ICD-10-CM

## 2022-08-21 LAB
S PYO AG THROAT QL: NEGATIVE
SARS-COV-2 RNA RESP QL NAA+PROBE: NOT DETECTED

## 2022-08-21 PROCEDURE — 99213 OFFICE O/P EST LOW 20 MIN: CPT

## 2022-08-21 PROCEDURE — 87880 STREP A ASSAY W/OPTIC: CPT

## 2022-08-21 RX ORDER — DEXAMETHASONE 4 MG/1
16 TABLET ORAL ONCE
Status: COMPLETED | OUTPATIENT
Start: 2022-08-21 | End: 2022-08-21

## 2022-08-21 RX ORDER — PREDNISONE 20 MG/1
40 TABLET ORAL DAILY
Qty: 8 TABLET | Refills: 0 | Status: SHIPPED | OUTPATIENT
Start: 2022-08-21 | End: 2022-08-25

## 2022-08-21 NOTE — ED INITIAL ASSESSMENT (HPI)
Pt c/o intermittant fever for past 10 days, cough and sorethroat starting Wednesday, 2 neg covid tests at home

## 2022-09-19 RX ORDER — LIRAGLUTIDE 6 MG/ML
3 INJECTION, SOLUTION SUBCUTANEOUS DAILY
Qty: 5 EACH | Refills: 1 | Status: SHIPPED | OUTPATIENT
Start: 2022-09-19 | End: 2022-10-19

## 2022-09-19 RX ORDER — PEN NEEDLE, DIABETIC 32GX 5/32"
NEEDLE, DISPOSABLE MISCELLANEOUS
Qty: 100 EACH | Refills: 2 | Status: SHIPPED | OUTPATIENT
Start: 2022-09-19

## 2022-09-19 NOTE — TELEPHONE ENCOUNTER
Requesting yesika  LOV: 4/27/22  RTC: none on files  Filled: 6/13/22 #5each with 2 refills    Future Appointments   Date Time Provider Adrianna Horn   10/19/2022 12:00 PM [facility]

## 2022-10-19 ENCOUNTER — OFFICE VISIT (OUTPATIENT)
Dept: INTERNAL MEDICINE CLINIC | Facility: CLINIC | Age: 27
End: 2022-10-19
Payer: COMMERCIAL

## 2022-10-19 VITALS
DIASTOLIC BLOOD PRESSURE: 60 MMHG | BODY MASS INDEX: 26.97 KG/M2 | RESPIRATION RATE: 16 BRPM | HEIGHT: 58.5 IN | WEIGHT: 132 LBS | HEART RATE: 68 BPM | SYSTOLIC BLOOD PRESSURE: 118 MMHG

## 2022-10-19 DIAGNOSIS — E55.9 VITAMIN D DEFICIENCY: ICD-10-CM

## 2022-10-19 DIAGNOSIS — E28.2 PCOS (POLYCYSTIC OVARIAN SYNDROME): Primary | ICD-10-CM

## 2022-10-19 DIAGNOSIS — Z51.81 THERAPEUTIC DRUG MONITORING: ICD-10-CM

## 2022-10-19 PROCEDURE — 99213 OFFICE O/P EST LOW 20 MIN: CPT | Performed by: INTERNAL MEDICINE

## 2022-10-19 PROCEDURE — 3074F SYST BP LT 130 MM HG: CPT | Performed by: INTERNAL MEDICINE

## 2022-10-19 PROCEDURE — 3008F BODY MASS INDEX DOCD: CPT | Performed by: INTERNAL MEDICINE

## 2022-10-19 PROCEDURE — 3078F DIAST BP <80 MM HG: CPT | Performed by: INTERNAL MEDICINE

## 2022-11-06 ENCOUNTER — PATIENT MESSAGE (OUTPATIENT)
Dept: INTERNAL MEDICINE CLINIC | Facility: CLINIC | Age: 27
End: 2022-11-06

## 2022-11-28 NOTE — TELEPHONE ENCOUNTER
Requesting WEgovy 2.4 mg  LOV: 10/19/22  RTC: not noted  Last Relevant Labs: na  Filled: 10/19/22 #3ml with 0 refills    Future Appointments   Date Time Provider Adrianna Horn   1/25/2023 12:00 PM Terra Vera MD [facility]

## 2022-12-29 ENCOUNTER — HOSPITAL ENCOUNTER (OUTPATIENT)
Age: 27
Discharge: HOME | End: 2022-12-29
Payer: COMMERCIAL

## 2022-12-29 VITALS
HEIGHT: 59 IN | TEMPERATURE: 99 F | OXYGEN SATURATION: 96 % | HEART RATE: 110 BPM | DIASTOLIC BLOOD PRESSURE: 71 MMHG | SYSTOLIC BLOOD PRESSURE: 106 MMHG | WEIGHT: 125 LBS | BODY MASS INDEX: 25.2 KG/M2 | RESPIRATION RATE: 20 BRPM

## 2022-12-29 DIAGNOSIS — J02.9 PHARYNGITIS, UNSPECIFIED ETIOLOGY: ICD-10-CM

## 2022-12-29 DIAGNOSIS — B34.9 VIRAL SYNDROME: Primary | ICD-10-CM

## 2022-12-29 LAB
POCT INFLUENZA A: NEGATIVE
POCT INFLUENZA B: NEGATIVE
POCT MONO: NEGATIVE
S PYO AG THROAT QL: NEGATIVE
SARS-COV-2 RNA RESP QL NAA+PROBE: NOT DETECTED

## 2022-12-29 PROCEDURE — 86308 HETEROPHILE ANTIBODY SCREEN: CPT | Performed by: NURSE PRACTITIONER

## 2022-12-29 PROCEDURE — 87081 CULTURE SCREEN ONLY: CPT | Performed by: NURSE PRACTITIONER

## 2022-12-29 PROCEDURE — 87502 INFLUENZA DNA AMP PROBE: CPT | Performed by: NURSE PRACTITIONER

## 2022-12-29 PROCEDURE — 87880 STREP A ASSAY W/OPTIC: CPT | Performed by: NURSE PRACTITIONER

## 2022-12-29 PROCEDURE — U0002 COVID-19 LAB TEST NON-CDC: HCPCS | Performed by: NURSE PRACTITIONER

## 2022-12-29 PROCEDURE — 99213 OFFICE O/P EST LOW 20 MIN: CPT | Performed by: NURSE PRACTITIONER

## 2022-12-29 RX ORDER — PREDNISONE 20 MG/1
40 TABLET ORAL DAILY
Qty: 10 TABLET | Refills: 0 | Status: SHIPPED | OUTPATIENT
Start: 2022-12-29 | End: 2023-01-03

## 2022-12-29 NOTE — ED INITIAL ASSESSMENT (HPI)
PT C/O MULTIPLE SYMPTOMS, PT C/O SORE THROAT, HEADACHE, STUFFY NOSE, RUNNY NOSE, BODY ACHES AND CHIILLS. PT STATES SHE WAS TREATED FOR STREP EARLY THIS MONTH. PT REQUESTING STREP, FLU AND COVID TESTING.

## 2022-12-29 NOTE — DISCHARGE INSTRUCTIONS
Tylenol and Motrin as needed for pain. Take the steroids as directed. We will call with your throat culture result.

## 2023-01-25 ENCOUNTER — OFFICE VISIT (OUTPATIENT)
Dept: INTERNAL MEDICINE CLINIC | Facility: CLINIC | Age: 28
End: 2023-01-25
Payer: COMMERCIAL

## 2023-01-25 VITALS
SYSTOLIC BLOOD PRESSURE: 118 MMHG | BODY MASS INDEX: 24.93 KG/M2 | WEIGHT: 122 LBS | HEIGHT: 58.5 IN | RESPIRATION RATE: 16 BRPM | HEART RATE: 78 BPM | DIASTOLIC BLOOD PRESSURE: 78 MMHG

## 2023-01-25 DIAGNOSIS — E28.2 PCOS (POLYCYSTIC OVARIAN SYNDROME): ICD-10-CM

## 2023-01-25 DIAGNOSIS — Z51.81 THERAPEUTIC DRUG MONITORING: Primary | ICD-10-CM

## 2023-01-25 DIAGNOSIS — Z83.49 FAMILY HISTORY OF THYROID DISEASE: ICD-10-CM

## 2023-01-25 DIAGNOSIS — R53.83 OTHER FATIGUE: ICD-10-CM

## 2023-01-25 PROCEDURE — 3078F DIAST BP <80 MM HG: CPT | Performed by: INTERNAL MEDICINE

## 2023-01-25 PROCEDURE — 3008F BODY MASS INDEX DOCD: CPT | Performed by: INTERNAL MEDICINE

## 2023-01-25 PROCEDURE — 99214 OFFICE O/P EST MOD 30 MIN: CPT | Performed by: INTERNAL MEDICINE

## 2023-01-25 PROCEDURE — 3074F SYST BP LT 130 MM HG: CPT | Performed by: INTERNAL MEDICINE

## 2023-01-25 RX ORDER — SEMAGLUTIDE 2.4 MG/.75ML
2.4 INJECTION, SOLUTION SUBCUTANEOUS
COMMUNITY
Start: 2022-12-28 | End: 2023-01-25

## 2023-01-25 RX ORDER — SEMAGLUTIDE 2.4 MG/.75ML
2.4 INJECTION, SOLUTION SUBCUTANEOUS WEEKLY
Qty: 4 EACH | Refills: 0 | Status: SHIPPED | OUTPATIENT
Start: 2023-01-25 | End: 2023-01-25

## 2023-04-12 RX ORDER — SEMAGLUTIDE 2.4 MG/.75ML
2.4 INJECTION, SOLUTION SUBCUTANEOUS WEEKLY
Qty: 2 ML | Refills: 2 | Status: SHIPPED | OUTPATIENT
Start: 2023-04-12

## 2023-05-30 DIAGNOSIS — N92.6 IRREGULAR MENSES: ICD-10-CM

## 2023-05-30 DIAGNOSIS — E28.2 PCOS (POLYCYSTIC OVARIAN SYNDROME): ICD-10-CM

## 2023-05-31 RX ORDER — ETONOGESTREL/ETHINYL ESTRADIOL .12-.015MG
RING, VAGINAL VAGINAL
Qty: 1 EACH | Refills: 6 | OUTPATIENT
Start: 2023-05-31

## 2023-07-27 NOTE — TELEPHONE ENCOUNTER
Requesting   Requested Prescriptions     Pending Prescriptions Disp Refills    semaglutide-weight management (WEGOVY) 1.7 MG/0.75ML Subcutaneous Solution Auto-injector 3 mL 2     Sig: Inject 0.75 mL (1.7 mg total) into the skin once a week. LOV: 1/25/23  RTC: not noted  Filled: 5/17/23 #3 with 2 refills    No future appointments.

## 2023-07-28 RX ORDER — SEMAGLUTIDE 1.7 MG/.75ML
1.7 INJECTION, SOLUTION SUBCUTANEOUS WEEKLY
Qty: 3 ML | Refills: 2 | Status: SHIPPED | OUTPATIENT
Start: 2023-07-28

## 2023-08-02 ENCOUNTER — OFFICE VISIT (OUTPATIENT)
Dept: INTERNAL MEDICINE CLINIC | Facility: CLINIC | Age: 28
End: 2023-08-02
Payer: COMMERCIAL

## 2023-08-02 VITALS
HEART RATE: 90 BPM | HEIGHT: 57.5 IN | RESPIRATION RATE: 20 BRPM | DIASTOLIC BLOOD PRESSURE: 70 MMHG | WEIGHT: 112 LBS | BODY MASS INDEX: 23.83 KG/M2 | SYSTOLIC BLOOD PRESSURE: 112 MMHG | OXYGEN SATURATION: 98 % | TEMPERATURE: 98 F

## 2023-08-02 DIAGNOSIS — F51.05 INSOMNIA DUE TO OTHER MENTAL DISORDER: ICD-10-CM

## 2023-08-02 DIAGNOSIS — Z13.220 SCREENING FOR CHOLESTEROL LEVEL: ICD-10-CM

## 2023-08-02 DIAGNOSIS — E28.2 PCOS (POLYCYSTIC OVARIAN SYNDROME): ICD-10-CM

## 2023-08-02 DIAGNOSIS — Z30.44 ENCOUNTER FOR SURVEILLANCE OF VAGINAL RING HORMONAL CONTRACEPTIVE DEVICE: ICD-10-CM

## 2023-08-02 DIAGNOSIS — F99 INSOMNIA DUE TO OTHER MENTAL DISORDER: ICD-10-CM

## 2023-08-02 DIAGNOSIS — R42 VERTIGO: ICD-10-CM

## 2023-08-02 DIAGNOSIS — Z00.00 ENCOUNTER FOR ANNUAL PHYSICAL EXAM: Primary | ICD-10-CM

## 2023-08-02 DIAGNOSIS — N92.6 IRREGULAR MENSES: ICD-10-CM

## 2023-08-02 DIAGNOSIS — R53.83 FATIGUE, UNSPECIFIED TYPE: ICD-10-CM

## 2023-08-02 DIAGNOSIS — F41.9 ANXIETY: ICD-10-CM

## 2023-08-02 DIAGNOSIS — Z13.29 SCREENING FOR THYROID DISORDER: ICD-10-CM

## 2023-08-02 PROCEDURE — 3074F SYST BP LT 130 MM HG: CPT | Performed by: NURSE PRACTITIONER

## 2023-08-02 PROCEDURE — 3078F DIAST BP <80 MM HG: CPT | Performed by: NURSE PRACTITIONER

## 2023-08-02 PROCEDURE — 3008F BODY MASS INDEX DOCD: CPT | Performed by: NURSE PRACTITIONER

## 2023-08-02 PROCEDURE — 99395 PREV VISIT EST AGE 18-39: CPT | Performed by: NURSE PRACTITIONER

## 2023-08-02 PROCEDURE — 99214 OFFICE O/P EST MOD 30 MIN: CPT | Performed by: NURSE PRACTITIONER

## 2023-08-02 RX ORDER — ETONOGESTREL AND ETHINYL ESTRADIOL 11.7; 2.7 MG/1; MG/1
1 INSERT, EXTENDED RELEASE VAGINAL
Qty: 3 RING | Refills: 3 | Status: SHIPPED | OUTPATIENT
Start: 2023-08-02

## 2023-09-25 RX ORDER — SEMAGLUTIDE 1.7 MG/.75ML
1.7 INJECTION, SOLUTION SUBCUTANEOUS WEEKLY
Qty: 3 ML | Refills: 2 | OUTPATIENT
Start: 2023-09-25

## 2023-09-25 NOTE — TELEPHONE ENCOUNTER
Requesting   Requested Prescriptions     Pending Prescriptions Disp Refills    semaglutide-weight management (WEGOVY) 1.7 MG/0.75ML Subcutaneous Solution Auto-injector 3 mL 2     Sig: Inject 0.75 mL (1.7 mg total) into the skin once a week. LOV: 1/25/23  RTC: not noted  Filled: 7/28/23 #3 with 2 refills    No future appointments.   Need appt

## 2023-11-01 ENCOUNTER — PATIENT MESSAGE (OUTPATIENT)
Dept: INTERNAL MEDICINE CLINIC | Facility: CLINIC | Age: 28
End: 2023-11-01

## 2023-11-01 DIAGNOSIS — L65.9 HAIR LOSS: Primary | ICD-10-CM

## 2023-11-01 NOTE — TELEPHONE ENCOUNTER
Also labs for hair loss including D, CBC, thyroid, B12 were completed and were okay. There is nothing else medically that I can look into on my end but I do suggest she can see dermatology for further evaluation of hair loss. Please provide a referral for Dr. Rika Torre and have her schedule. Thank you.

## 2023-12-04 ENCOUNTER — HOSPITAL ENCOUNTER (OUTPATIENT)
Age: 28
Discharge: HOME | End: 2023-12-04
Payer: COMMERCIAL

## 2023-12-04 VITALS
RESPIRATION RATE: 18 BRPM | OXYGEN SATURATION: 100 % | BODY MASS INDEX: 26 KG/M2 | DIASTOLIC BLOOD PRESSURE: 63 MMHG | WEIGHT: 120 LBS | HEART RATE: 98 BPM | TEMPERATURE: 98 F | SYSTOLIC BLOOD PRESSURE: 106 MMHG

## 2023-12-04 DIAGNOSIS — J20.8 VIRAL BRONCHITIS: Primary | ICD-10-CM

## 2023-12-04 DIAGNOSIS — J04.0 ACUTE LARYNGITIS: ICD-10-CM

## 2023-12-04 LAB — S PYO AG THROAT QL IA.RAPID: NEGATIVE

## 2023-12-04 PROCEDURE — 99214 OFFICE O/P EST MOD 30 MIN: CPT

## 2023-12-04 PROCEDURE — 87651 STREP A DNA AMP PROBE: CPT | Performed by: NURSE PRACTITIONER

## 2023-12-04 PROCEDURE — 99213 OFFICE O/P EST LOW 20 MIN: CPT

## 2023-12-04 RX ORDER — PREDNISONE 20 MG/1
40 TABLET ORAL DAILY
Qty: 10 TABLET | Refills: 0 | Status: SHIPPED | OUTPATIENT
Start: 2023-12-04 | End: 2023-12-09

## 2023-12-04 NOTE — ED INITIAL ASSESSMENT (HPI)
C/o cough for 2 weeks, on and off headache and chills. Home kit Covid test NEgative 2 times. Thursday started with sore throat and loss of voice.  No fever

## 2023-12-04 NOTE — DISCHARGE INSTRUCTIONS
Rest and drink plenty of fluids. This will help to thin the mucous in the back of your throat. Take Tylenol and/or ibuprofen as needed for pain or fever. Use Zyrtec, Claritin, or Allegra to help with nasal drainage. You can take this twice a day. Try Flonase or Nasonex nasal spray. You can also take Sudafed to help with sinus pressure or pain. Get the medication behind the pharmacy counter. Take Robitussin or Delsym as needed for cough. Start the prednisone and make sure to take this with food. Follow up with your PCP in 5-7 days. Your symptoms should improve in the next 7-10 days; however, the cough can linger for much longer. Thank you for choosing [facility] for your care.

## 2023-12-05 ENCOUNTER — HOSPITAL ENCOUNTER (OUTPATIENT)
Age: 28
Discharge: HOME | End: 2023-12-05
Payer: COMMERCIAL

## 2023-12-05 VITALS
RESPIRATION RATE: 20 BRPM | HEART RATE: 70 BPM | OXYGEN SATURATION: 97 % | TEMPERATURE: 99 F | DIASTOLIC BLOOD PRESSURE: 73 MMHG | SYSTOLIC BLOOD PRESSURE: 117 MMHG

## 2023-12-05 DIAGNOSIS — B34.9 VIRAL SYNDROME: ICD-10-CM

## 2023-12-05 DIAGNOSIS — R05.1 ACUTE COUGH: Primary | ICD-10-CM

## 2023-12-05 LAB
POCT INFLUENZA A: NEGATIVE
POCT INFLUENZA B: NEGATIVE
SARS-COV-2 RNA RESP QL NAA+PROBE: NOT DETECTED

## 2023-12-05 PROCEDURE — 99213 OFFICE O/P EST LOW 20 MIN: CPT | Performed by: NURSE PRACTITIONER

## 2023-12-05 PROCEDURE — 87502 INFLUENZA DNA AMP PROBE: CPT | Performed by: NURSE PRACTITIONER

## 2023-12-05 PROCEDURE — U0002 COVID-19 LAB TEST NON-CDC: HCPCS | Performed by: NURSE PRACTITIONER

## 2023-12-05 RX ORDER — BENZONATATE 100 MG/1
100 CAPSULE ORAL 3 TIMES DAILY PRN
Qty: 30 CAPSULE | Refills: 0 | Status: SHIPPED | OUTPATIENT
Start: 2023-12-05 | End: 2024-01-04

## 2023-12-05 RX ORDER — CODEINE PHOSPHATE AND GUAIFENESIN 10; 100 MG/5ML; MG/5ML
SOLUTION ORAL NIGHTLY PRN
Qty: 60 ML | Refills: 0 | Status: SHIPPED | OUTPATIENT
Start: 2023-12-05 | End: 2023-12-11

## 2023-12-05 RX ORDER — SEMAGLUTIDE 1.7 MG/.75ML
1.7 INJECTION, SOLUTION SUBCUTANEOUS WEEKLY
Qty: 3 ML | Refills: 2 | OUTPATIENT
Start: 2023-12-05

## 2023-12-05 NOTE — TELEPHONE ENCOUNTER
Requesting   Requested Prescriptions     Pending Prescriptions Disp Refills    semaglutide-weight management (WEGOVY) 1.7 MG/0.75ML Subcutaneous Solution Auto-injector 3 mL 2     Sig: Inject 0.75 mL (1.7 mg total) into the skin once a week. LOV: 1/23/23  RTC: not noted  Filled: 7/28/23 #3 with 2 refills    No future appointments.     Needs appt

## 2023-12-05 NOTE — DISCHARGE INSTRUCTIONS
Take the Tessalon as directed. If you take the Cheratussin [facility] do not drive for 6 to 8 hours. Increase oral fluids. Tylenol and Motrin alternating for any pain or fever. Follow-up with your primary care doctor as needed.

## 2023-12-05 NOTE — ED INITIAL ASSESSMENT (HPI)
Pt c/o multiple symptoms starting on Thursday. Pt c/o cough, headache, body aches ,chills and stuffy nose.

## 2023-12-26 RX ORDER — SEMAGLUTIDE 1.7 MG/.75ML
1.7 INJECTION, SOLUTION SUBCUTANEOUS WEEKLY
Refills: 2 | OUTPATIENT
Start: 2023-12-26

## 2023-12-26 NOTE — TELEPHONE ENCOUNTER
Requesting   Requested Prescriptions     Pending Prescriptions Disp Refills    WEGOVY 1.7 MG/0.75ML Subcutaneous Solution Auto-injector [Pharmacy Med Name: WEGOVY 1.7 MG/0.75 ML PEN]  2     Sig: INJECT 0.75 ML (1.7 MG TOTAL) INTO THE SKIN ONCE A WEEK.      LOV: 1/25/23  RTC: none  Last Relevant Labs: n/a  Filled: 7/28/23 #3ml with 2 refills    No future appointments.       Patient needs appointment

## 2024-02-13 ENCOUNTER — HOSPITAL ENCOUNTER (OUTPATIENT)
Age: 29
Discharge: HOME | End: 2024-02-13
Payer: COMMERCIAL

## 2024-02-13 VITALS
BODY MASS INDEX: 26.21 KG/M2 | DIASTOLIC BLOOD PRESSURE: 75 MMHG | HEART RATE: 89 BPM | SYSTOLIC BLOOD PRESSURE: 117 MMHG | HEIGHT: 59 IN | TEMPERATURE: 97 F | WEIGHT: 130 LBS | OXYGEN SATURATION: 99 % | RESPIRATION RATE: 18 BRPM

## 2024-02-13 DIAGNOSIS — R09.81 NASAL CONGESTION: ICD-10-CM

## 2024-02-13 DIAGNOSIS — J02.9 SORE THROAT: ICD-10-CM

## 2024-02-13 DIAGNOSIS — R68.83 CHILLS: Primary | ICD-10-CM

## 2024-02-13 DIAGNOSIS — R82.998 LEUKOCYTES IN URINE: ICD-10-CM

## 2024-02-13 DIAGNOSIS — R51.9 ACUTE NONINTRACTABLE HEADACHE, UNSPECIFIED HEADACHE TYPE: ICD-10-CM

## 2024-02-13 LAB
B-HCG UR QL: NEGATIVE
CLARITY UR: CLEAR
COLOR UR: YELLOW
GLUCOSE UR STRIP-MCNC: NEGATIVE MG/DL
HGB UR QL STRIP: NEGATIVE
NITRITE UR QL STRIP: NEGATIVE
PH UR STRIP: 7 [PH]
POCT INFLUENZA A: NEGATIVE
POCT INFLUENZA B: NEGATIVE
S PYO AG THROAT QL: NEGATIVE
SARS-COV-2 RNA RESP QL NAA+PROBE: NOT DETECTED
SP GR UR STRIP: 1.01
UROBILINOGEN UR STRIP-ACNC: <2 MG/DL

## 2024-02-13 PROCEDURE — 87502 INFLUENZA DNA AMP PROBE: CPT | Performed by: NURSE PRACTITIONER

## 2024-02-13 PROCEDURE — 99214 OFFICE O/P EST MOD 30 MIN: CPT | Performed by: NURSE PRACTITIONER

## 2024-02-13 PROCEDURE — 81025 URINE PREGNANCY TEST: CPT | Performed by: NURSE PRACTITIONER

## 2024-02-13 PROCEDURE — 87086 URINE CULTURE/COLONY COUNT: CPT | Performed by: NURSE PRACTITIONER

## 2024-02-13 PROCEDURE — U0002 COVID-19 LAB TEST NON-CDC: HCPCS | Performed by: NURSE PRACTITIONER

## 2024-02-13 PROCEDURE — 87880 STREP A ASSAY W/OPTIC: CPT | Performed by: NURSE PRACTITIONER

## 2024-02-13 PROCEDURE — 81002 URINALYSIS NONAUTO W/O SCOPE: CPT | Performed by: NURSE PRACTITIONER

## 2024-02-13 RX ORDER — ONDANSETRON 4 MG/1
4 TABLET, ORALLY DISINTEGRATING ORAL EVERY 4 HOURS PRN
Qty: 10 TABLET | Refills: 0 | Status: SHIPPED | OUTPATIENT
Start: 2024-02-13 | End: 2024-02-20

## 2024-02-14 NOTE — DISCHARGE INSTRUCTIONS
Take the ondansetron as needed for nausea/vomiting.  Boykin diet until resolution of symptoms.  Humidifier in the same room.  Hot steam showers/steam inhalations.  Over-the-counter Flonase for the nasal congestion.  Interventions for sore throat: Warm salt water gargles 3 times daily.  Take a teaspoon of honey on its own or  in another liquid like juice or tea. Cough or throat drops.  Drink plenty of fluids, mainly consisting of water.  Follow-up with your doctor in 2 to 3 days.  Return or go to the ER for new or worsening symptoms.

## 2024-02-14 NOTE — ED PROVIDER NOTES
Patient Seen in: [facility]      History     Chief Complaint   Patient presents with    Body ache and/or chills    Sore Throat    Headache     Stated Complaint: Sore Throat;Chills    Subjective:   28-year-old female who has had a couple days of nausea, vomiting, nasal congestion, chills, headache, sore throat.  No known sick exposure.  States she took some Dramamine as she thought it would help with the nausea.  States she took some just prior to arrival.  States right now she does not really feel too nauseous, and she has been able to keep fluids down for the last half hour or so.  States there is no chance of pregnancy.            Objective:   Past Medical History:   Diagnosis Date    Patient denies medical problems               Past Surgical History:   Procedure Laterality Date    PATIENT DENIES ANY SURGICAL HISTORY                  Social History     Socioeconomic History    Marital status: Single   Occupational History    Occupation: student   Tobacco Use    Smoking status: Never     Passive exposure: Yes    Smokeless tobacco: Never   Vaping Use    Vaping Use: Never used   Substance and Sexual Activity    Alcohol use: No    Drug use: No    Sexual activity: Never   Other Topics Concern    Exercise Yes     Comment: 2-3x/s week    Seat Belt Yes              Review of Systems   Constitutional:  Positive for chills.   HENT:  Positive for congestion and sore throat.    Gastrointestinal:  Positive for nausea and vomiting.   Neurological:  Positive for headaches.   All other systems reviewed and are negative.      Positive for stated complaint: Sore Throat;Chills  Other systems are as noted in HPI.  Constitutional and vital signs reviewed.      All other systems reviewed and negative except as noted above.    Physical Exam     ED Triage Vitals [02/13/24 1852]   /75   Pulse 89   Resp 18   Temp 97.1 °F (36.2 °C)   Temp src    SpO2 99 %   O2 Device None (Room air)       Current:/75    Pulse 89   Temp 97.1 °F (36.2 °C)   Resp 18   Ht 149.9 cm (4' 11\")   Wt 59 kg   LMP 01/15/2024 (Approximate)   SpO2 99%   BMI 26.26 kg/m²         Physical Exam  Vitals and nursing note reviewed.   Constitutional:       General: She is not in acute distress.     Appearance: She is well-developed. She is not ill-appearing, toxic-appearing or diaphoretic.   HENT:      Head:      Jaw: No trismus.      Right Ear: Tympanic membrane, ear canal and external ear normal.      Left Ear: Tympanic membrane, ear canal and external ear normal.      Nose: Congestion present.      Mouth/Throat:      Lips: Pink. No lesions.      Mouth: Mucous membranes are moist. No oral lesions.      Tongue: No lesions.      Palate: No lesions.      Pharynx: Oropharynx is clear. Uvula midline. No pharyngeal swelling, oropharyngeal exudate, posterior oropharyngeal erythema or uvula swelling.   Cardiovascular:      Rate and Rhythm: Normal rate and regular rhythm.      Pulses: Normal pulses.      Heart sounds: Normal heart sounds.   Pulmonary:      Effort: No respiratory distress.      Breath sounds: Normal breath sounds.   Abdominal:      General: Abdomen is flat.      Palpations: Abdomen is soft.      Tenderness: There is no abdominal tenderness.   Skin:     General: Skin is warm and dry.      Coloration: Skin is not pale.      Findings: No rash.   Neurological:      General: No focal deficit present.      Mental Status: She is alert.   Psychiatric:         Mood and Affect: Mood normal.               ED Course     Labs Reviewed   [facility] POCT URINALYSIS DIPSTICK - Abnormal; Notable for the following components:       Result Value    Protein urine Trace (*)     Ketone, Urine Trace (*)     Bilirubin, Urine Small (*)     Leukocyte esterase urine Trace (*)     All other components within normal limits   POCT RAPID STREP - Normal   POCT PREGNANCY URINE - Normal   POCT FLU TEST - Normal    Narrative:     This assay is a rapid molecular in vitro test  utilizing nucleic acid amplification of influenza A and B viral RNA.   RAPID SARS-COV-2 BY PCR - Normal   URINE CULTURE, ROUTINE                      MDM                                         Medical Decision Making  Differential diagnosis initially included but was not limited to: COVID, COVID, flu    28-year-old female who has had a couple days of nausea, vomiting, nasal congestion, chills, headache, sore throat.  No known sick exposure.  States she took some Dramamine as she thought it would help with the nausea.  States she took some just prior to arrival.  States right now she does not really feel too nauseous, and she has been able to keep fluids down for the last half hour or so.  States there is no chance of pregnancy.There is no trismus, drooling, unilateral tonsillar tonsillar swelling, voice change/muffled voice, so I do not think this is epiglottitis/peritonsillar abscess.  No adventitious breath sounds.  No signs of increased work of breathing or respiratory distress.  Abdomen soft, nontender.  Unlikely to be an acute or surgical abdomen.  Negative strep.  Negative flu.  Negative COVID.  Likely viral.  UTI symptoms.  Trace leukocytes in urine.  However given she does not present with typical UTI symptoms, I will hold off on antibiotics.  Will send the urine for culture.    Supportive/home management of diagnosis/illness/injury discussed. Red flag symptoms discussed.  Signs and symptoms/criteria that would necessitate reevaluation, including ER evaluation discussed.  Patient and/or responsible adult verbalize and agree with management and plan of care.    Speech recognition software was used during this dictation.  There may be minor errors in transcription.      Supportive/home management of diagnosis/illness/injury discussed. Red flag symptoms discussed.  Signs and symptoms/criteria that would necessitate reevaluation, including ER evaluation discussed.  Patient and/or responsible adult verbalize and  agree with management and plan of care.    Speech recognition software was used during this dictation.  There may be minor errors in transcription.        Amount and/or Complexity of Data Reviewed  Labs: ordered. Decision-making details documented in ED Course.    Risk  OTC drugs.  Prescription drug management.        Disposition and Plan     Clinical Impression:  1. Chills    2. Sore throat    3. Nasal congestion    4. Acute nonintractable headache, unspecified headache type    5. Leukocytes in urine         Disposition:  Discharge  2/13/2024  7:22 pm    Follow-up:  Hien Luz MD  [address] [id number] [phone]    Schedule an appointment as soon as possible for a visit in 2 days            Medications Prescribed:  Current Discharge Medication List        START taking these medications    Details   ondansetron 4 MG Oral Tablet Dispersible Take 1 tablet (4 mg total) by mouth every 4 (four) hours as needed for Nausea.  Qty: 10 tablet, Refills: 0

## 2024-04-24 ENCOUNTER — OFFICE VISIT (OUTPATIENT)
Dept: INTERNAL MEDICINE CLINIC | Facility: CLINIC | Age: 29
End: 2024-04-24
Payer: COMMERCIAL

## 2024-04-24 VITALS
DIASTOLIC BLOOD PRESSURE: 60 MMHG | WEIGHT: 139 LBS | BODY MASS INDEX: 28.02 KG/M2 | RESPIRATION RATE: 16 BRPM | SYSTOLIC BLOOD PRESSURE: 110 MMHG | HEART RATE: 96 BPM | HEIGHT: 59 IN

## 2024-04-24 DIAGNOSIS — E28.2 PCOS (POLYCYSTIC OVARIAN SYNDROME): ICD-10-CM

## 2024-04-24 DIAGNOSIS — Z51.81 THERAPEUTIC DRUG MONITORING: Primary | ICD-10-CM

## 2024-04-24 DIAGNOSIS — E66.9 OBESITY (BMI 30-39.9): ICD-10-CM

## 2024-04-24 PROCEDURE — 3074F SYST BP LT 130 MM HG: CPT | Performed by: INTERNAL MEDICINE

## 2024-04-24 PROCEDURE — 3078F DIAST BP <80 MM HG: CPT | Performed by: INTERNAL MEDICINE

## 2024-04-24 PROCEDURE — 3008F BODY MASS INDEX DOCD: CPT | Performed by: INTERNAL MEDICINE

## 2024-04-24 PROCEDURE — 99214 OFFICE O/P EST MOD 30 MIN: CPT | Performed by: INTERNAL MEDICINE

## 2024-04-24 RX ORDER — SPIRONOLACTONE 50 MG/1
TABLET, FILM COATED ORAL
COMMUNITY
Start: 2024-03-08

## 2024-04-24 NOTE — PROGRESS NOTES
HISTORY OF PRESENT ILLNESS  Chief Complaint   Patient presents with    Weight Check     Up 17      Sarah Gamboa is a 28 year old female here for follow up in medical weight loss program.     Denies chest pain, shortness of breath, dizziness, blurred vision, headache, paresthesia, nausea/vomiting.     Up 17 lb from previous   Has been struggling with being sick every few weeks   More hunger / more appetite  Struggling with physical activity as well recovering acute illness  Is doing better with eating thorughout the day   Gets home later and eats later         Wt Readings from Last 6 Encounters:   04/24/24 139 lb (63 kg)   02/13/24 130 lb (59 kg)   12/04/23 120 lb (54.4 kg)   08/02/23 112 lb (50.8 kg)   01/25/23 122 lb (55.3 kg)   12/29/22 125 lb (56.7 kg)            Breakfast Lunch Dinner Snacks Fluids   Reviewed            REVIEW OF SYSTEMS  GENERAL HEALTH: feels well otherwise, denied any fevers chills or night sweats   RESPIRATORY: denies shortness of breath   CARDIOVASCULAR: denies chest pain  GI: denies abdominal pain    EXAM  /60   Pulse 96   Resp 16   Ht 4' 11\" (1.499 m)   Wt 139 lb (63 kg)   LMP 04/23/2024 (Exact Date)   BMI 28.07 kg/m²   GENERAL: well developed, well nourished,in no apparent distress, A/O x3  SKIN: no rashes,no suspicious lesions  HEENT: atraumatic, normocephalic, OP-clear, PERRL  NECK: supple,no adenopathy  LUNGS: clear to auscultation bilaterally   CARDIO: RRR without murmur  GI: good BS's,NT/ND, no masses or HSM  EXTREMITIES: no cyanosis, no clubbing, no edema    Lab Results   Component Value Date    WBC 9.3 07/12/2022    RBC 4.58 05/22/2020    HGB 13.3 07/12/2022    HCT 40.3 07/12/2022    MCV 89.6 07/12/2022    MCH 27.1 05/22/2020    MCHC 31.2 05/22/2020    RDW 14.5 05/22/2020     07/12/2022     Lab Results   Component Value Date    GLU 91 07/12/2022    BUN 12 07/12/2022    BUNCREA 13.3 12/17/2020    CREATSERUM 0.73 07/12/2022    ANIONGAP 10 07/12/2022    GFR  88 06/02/2021    GFRNAA 81 12/17/2020    GFRAA 93 12/17/2020    CA 8.9 07/12/2022    OSMOCALC 283 12/17/2020    ALKPHO 72 12/17/2020    AST 15 07/12/2022    ALT 10 07/12/2022    BILT 0.2 07/12/2022    TP 7.4 07/12/2022    ALB 3.8 12/17/2020    GLOBULIN 4.5 (H) 12/17/2020     12/17/2020    K 4.3 07/12/2022     07/12/2022    CO2 24 07/12/2022     Lab Results   Component Value Date     05/22/2020    A1C 5.2 05/22/2020     Lab Results   Component Value Date    CHOLEST 172 07/12/2022    TRIG 102 07/12/2022    HDL 61 07/12/2022    LDL 95 05/22/2020    VLDL 44 (H) 05/22/2020    TCHDLRATIO 3.00 06/02/2021    NONHDLC 109 06/02/2021     Lab Results   Component Value Date    T4F 1.3 12/17/2020    TSH 0.840 07/12/2022     Lab Results   Component Value Date    B12 368 12/17/2020     Lab Results   Component Value Date    VITD 39.1 12/17/2020       Current Outpatient Medications on File Prior to Visit   Medication Sig Dispense Refill    spironolactone 50 MG Oral Tab       Etonogestrel-Ethinyl Estradiol (ELURYNG) 0.12-0.015 MG/24HR Vaginal Ring Place 1 Ring vaginally every 28 days. LEAVE IN PLACE FOR 21 DAYS, REMOVE FOR 7 DAYS REINSERT NEW RING 3 Ring 3    ibuprofen 800 MG Oral Tab TAKE 1 TABLET BY MOUTH THREE TIMES DAILY WITH FOOD AND WATER when have painful menses 21 tablet 0    Multiple Vitamins-Minerals (MULTIVITAMIN ADULT) Oral Tab Take 1 tablet by mouth daily.       No current facility-administered medications on file prior to visit.       ASSESSMENT  Analyzed weight data:       Diagnoses and all orders for this visit:    Therapeutic drug monitoring    PCOS (polycystic ovarian syndrome)    BMI 32.0-32.9,adult    Obesity (BMI 30-39.9)    Other orders  -     semaglutide-weight management 0.5 MG/0.5ML Subcutaneous Solution Auto-injector; Inject 0.5 mL (0.5 mg total) into the skin once a week for 4 doses.  -     semaglutide-weight management 1 MG/0.5ML Subcutaneous Solution Auto-injector; Inject 0.5 mL (1 mg  total) into the skin once a week for 8 doses.        PLAN  Initial consult: 12/16/20: 155 lb    Down 16 lb   Total time spent on chart review, pre-charting, obtaining history, counseling, and educating, reviewing labs was 30 minutes.  Resume wegovy   -advised of side effects and adverse effects of this medication  Reviewed recovery from acute illness, increase exercise as tolerated  Increase strength training as tolerated.  -advised of side effects and adverse effects of this medication  Nutrition: low carb diet/ recommended to eat breakfast daily/ regular protein intake  Medication use and side effects reviewed with patient.  Medication contraindications: n/a  Follow up with dietitian and psychologist as recommended.  Discussed the role of sleep and stress in weight management.  Counseled on comprehensive weight loss plan including attention to nutrition, exercise and behavior/stress management for success. See patient instruction below for more details.  Discussed strategies to overcome barriers to successful weight loss and weight maintenance  FITTE: ACSM recommendations (150-300 minutes/ week in active weight loss)   Weight Loss consent to treat reviewed and signed     There are no Patient Instructions on file for this visit.    No follow-ups on file.    Patient verbalizes understanding.    Sun Gtz MD      Answers submitted by the patient for this visit:  Medical Weight Loss Follow Up (Submitted on 4/24/2024)  If greater than 5/1O how would you grade your coping mechanisms?: fair

## 2024-04-30 ENCOUNTER — PATIENT MESSAGE (OUTPATIENT)
Dept: INTERNAL MEDICINE CLINIC | Facility: CLINIC | Age: 29
End: 2024-04-30

## 2024-05-01 NOTE — TELEPHONE ENCOUNTER
From: Sarah Gamboa  To: Sun Gtz  Sent: 4/30/2024 1:53 PM CDT  Subject: Prescription authorization    Hi I just wanted to follow up on my prescription for wegovy. My pharmacy says that the medication requires authorization, so just wanted to know the status on that please.    Thank you!

## 2024-05-23 NOTE — TELEPHONE ENCOUNTER
Requesting   Requested Prescriptions     Pending Prescriptions Disp Refills    semaglutide-weight management 1 MG/0.5ML Subcutaneous Solution Auto-injector 2 mL 1     Sig: Inject 0.5 mL (1 mg total) into the skin once a week for 8 doses.        LOV: 04/24/2024  RTC:   Last Relevant Labs:   Filled: 04/24/2024 #2 mL with 1 refills    Future Appointments   Date Time Provider Department Center   9/4/2024 12:20 PM Sun Gtz MD [facility]

## 2024-07-08 ENCOUNTER — PATIENT MESSAGE (OUTPATIENT)
Dept: INTERNAL MEDICINE CLINIC | Facility: CLINIC | Age: 29
End: 2024-07-08

## 2024-07-08 DIAGNOSIS — E66.9 OBESITY (BMI 30-39.9): Primary | ICD-10-CM

## 2024-07-08 NOTE — TELEPHONE ENCOUNTER
NS- refill request- patient requesting to increase to Wegovy 1.7mg  -current home weight 131 lbs  -patient \"doesn't really feel a difference at 1mg dose after 6-weeks    Requesting: wegovy  LOV: 4/24/24 (weight 139 lbs)  PLAN  Initial consult: 12/16/20: 155 lb    Down 16 lb   Total time spent on chart review, pre-charting, obtaining history, counseling, and educating, reviewing labs was 30 minutes.  Resume wegovy     RTC: no follow-ups on file  Last Relevant Labs: n/a  Filled: 5/23/24 Wegovy 1mg #2ml with 1 refills    Future Appointments   Date Time Provider Department Center   9/4/2024 12:20 PM Sun Gtz MD [facility]

## 2024-07-08 NOTE — TELEPHONE ENCOUNTER
From: Sarah Gamboa  To: Sun Gtz  Sent: 7/8/2024 10:17 AM CDT  Subject: Wegovy dose    Hi, I was wondering if it would be possible to ask Dr. Gtz if I could go up on the wegovy dose. I’m currently on 1mg and it’s been about a month and a half since I’ve been on this dose and I don’t really feel a difference. She increased me from 0.5mg to 1mg after a month so I wanted to know if she could do the same with my current dose. I’m not scheduled to see her for follow up until September so I thought I’d just ask.   Thanks

## 2024-07-09 DIAGNOSIS — E28.2 PCOS (POLYCYSTIC OVARIAN SYNDROME): ICD-10-CM

## 2024-07-09 DIAGNOSIS — N92.6 IRREGULAR MENSES: ICD-10-CM

## 2024-07-09 DIAGNOSIS — Z30.44 ENCOUNTER FOR SURVEILLANCE OF VAGINAL RING HORMONAL CONTRACEPTIVE DEVICE: ICD-10-CM

## 2024-07-11 RX ORDER — ETONOGESTREL AND ETHINYL ESTRADIOL .12; .015 MG/D; MG/D
RING VAGINAL
Qty: 10 EACH | Refills: 0 | Status: SHIPPED | OUTPATIENT
Start: 2024-07-11

## 2024-07-11 NOTE — TELEPHONE ENCOUNTER
Last OV relevant to medication: 8/2/23  Last refill date: 8/2/23 3     #/refills: 3  When pt was asked to return for OV: 1 year   Upcoming appt/reason:  N/a     Was pt informed of any over due labs: n/a       Front dest, please call the pt also to make appt, thank you

## 2024-08-05 ENCOUNTER — OFFICE VISIT (OUTPATIENT)
Dept: INTERNAL MEDICINE CLINIC | Facility: CLINIC | Age: 29
End: 2024-08-05
Payer: COMMERCIAL

## 2024-08-05 VITALS
RESPIRATION RATE: 16 BRPM | BODY MASS INDEX: 26.11 KG/M2 | DIASTOLIC BLOOD PRESSURE: 60 MMHG | WEIGHT: 124.38 LBS | HEART RATE: 90 BPM | TEMPERATURE: 97 F | SYSTOLIC BLOOD PRESSURE: 100 MMHG | HEIGHT: 58 IN | OXYGEN SATURATION: 97 %

## 2024-08-05 DIAGNOSIS — Z13.220 SCREENING FOR CHOLESTEROL LEVEL: ICD-10-CM

## 2024-08-05 DIAGNOSIS — Z00.00 ENCOUNTER FOR ANNUAL PHYSICAL EXAM: Primary | ICD-10-CM

## 2024-08-05 DIAGNOSIS — E28.2 PCOS (POLYCYSTIC OVARIAN SYNDROME): ICD-10-CM

## 2024-08-05 DIAGNOSIS — Z30.44 ENCOUNTER FOR SURVEILLANCE OF VAGINAL RING HORMONAL CONTRACEPTIVE DEVICE: ICD-10-CM

## 2024-08-05 DIAGNOSIS — Z13.29 SCREENING FOR THYROID DISORDER: ICD-10-CM

## 2024-08-05 DIAGNOSIS — N92.6 IRREGULAR MENSES: ICD-10-CM

## 2024-08-05 RX ORDER — SPIRONOLACTONE 100 MG/1
TABLET, FILM COATED ORAL
COMMUNITY
Start: 2024-07-01

## 2024-08-05 RX ORDER — ETONOGESTREL AND ETHINYL ESTRADIOL VAGINAL RING .015; .12 MG/D; MG/D
1 RING VAGINAL AS DIRECTED
Qty: 12 EACH | Refills: 0 | Status: SHIPPED | OUTPATIENT
Start: 2024-08-05

## 2024-08-05 NOTE — PATIENT INSTRUCTIONS
Get your labs done. You should be fasting for at least 10 hours. If you take a multivitamin with Biotin or any biotin product it should be held for 3 days prior to getting your labs done.     Continue your current birth control    Follow up in 1 year or sooner as needed

## 2024-08-05 NOTE — PROGRESS NOTES
CHIEF COMPLAINT   Well woman exam, med check    HPI:   Sarah Gamboa is a 29 year old female who presents for a complete physical exam, med check     Wt Readings from Last 6 Encounters:   08/05/24 124 lb 6.4 oz (56.4 kg)   04/24/24 139 lb (63 kg)   02/13/24 130 lb (59 kg)   12/04/23 120 lb (54.4 kg)   08/02/23 112 lb (50.8 kg)   01/25/23 122 lb (55.3 kg)     Body mass index is 26 kg/m².     Her diet and exercise are good. Vaccines reviewed. Wearing seat belt and no texting and driving. Lives at home with mom and dad, feels safe at home. Never sexualy active, therefore does not want a pap. No pelvic concerns. She does perform her monthly self breast exams. Non-smoker. Drinks socially in moderation. Denies depression and anxiety. Labs to be ordered.    BC- on vaginal ring for PCOS/irregular cycles. Is working well. No SE.     Fatigue- improved after weight loss but still present slightly. Is manageable.       Cholesterol, Total (mg/dL)   Date Value   07/12/2022 172   06/02/2021 164   05/22/2020 191   05/16/2019 191   11/02/2017 154     HDL Cholesterol (mg/dL)   Date Value   07/12/2022 61   06/02/2021 55   05/22/2020 52   05/16/2019 49   11/02/2017 44 (L)     LDL Cholesterol (mg/dL)   Date Value   05/22/2020 95   05/16/2019 120 (H)   11/02/2017 87     AST (U/L)   Date Value   07/12/2022 15   06/02/2021 18   12/17/2020 23   05/22/2020 18   11/07/2019 13 (L)     ALT (U/L)   Date Value   07/12/2022 10   06/02/2021 14   12/17/2020 31   05/22/2020 28   11/07/2019 24        Current Outpatient Medications   Medication Sig Dispense Refill    spironolactone 100 MG Oral Tab       ELURYNG 0.12-0.015 MG/24HR Vaginal Ring Unwrap and insert into the vagina as directed, leave in place for 3 weeks and remove for 1 week 10 each 0    ibuprofen 800 MG Oral Tab TAKE 1 TABLET BY MOUTH THREE TIMES DAILY WITH FOOD AND WATER when have painful menses 21 tablet 0    Multiple Vitamins-Minerals (MULTIVITAMIN ADULT) Oral Tab Take 1 tablet  by mouth daily.        Past Medical History:    Patient denies medical problems      Past Surgical History:   Procedure Laterality Date    Patient denies any surgical history        Family History   Problem Relation Age of Onset    Anemia Mother     Lipids Mother     Thyroid disease Mother     Diabetes Father     Lipids Father     Hypertension Father     Heart Disorder Father     No Known Problems Sister     Psychiatric Brother     No Known Problems Maternal Grandmother     Diabetes Maternal Grandfather     Hypertension Maternal Grandfather     Depression Maternal Grandfather     Anxiety Maternal Grandfather     Breast Cancer Paternal Grandmother     Dementia Paternal Grandmother     Heart Attack Paternal Grandfather     Diabetes Paternal Grandfather     Hypertension Paternal Grandfather     No Known Problems Brother     No Known Problems Brother     No Known Problems Brother       Social History:   Social History     Socioeconomic History    Marital status: Single   Occupational History    Occupation: student   Tobacco Use    Smoking status: Never     Passive exposure: Yes    Smokeless tobacco: Never   Vaping Use    Vaping status: Never Used   Substance and Sexual Activity    Alcohol use: No    Drug use: No    Sexual activity: Never   Other Topics Concern    Exercise Yes     Comment: 2-3x/s week    Seat Belt Yes         REVIEW OF SYSTEMS:   GENERAL: feels fatigued - improved.   SKIN: no complaint of any unusual skin lesions  EYES: no complaint of blurred vision or double vision  HEENT: no complaint of nasal congestion, sinus pain or ST  LUNGS: no complaint of shortness of breath with exertion  CARDIOVASCULAR: no complaint of chest pain on exertion  GI: no complaint of pain, denies heartburn  : No complains of dysuria or vaginal discharge  MUSCULOSKELETAL: no complaint of back pain  NEURO: no complaint of headaches  PSYCHE: no complaint of depression or anxiety  HEMATOLOGIC: denies hx of anemia    EXAM:   BP  100/60 (BP Location: Left arm, Patient Position: Sitting, Cuff Size: adult)   Pulse 90   Temp 97.3 °F (36.3 °C) (Temporal)   Resp 16   Ht 4' 10\" (1.473 m)   Wt 124 lb 6.4 oz (56.4 kg)   LMP 04/23/2024 (Exact Date)   SpO2 97%   BMI 26.00 kg/m²   Body mass index is 26 kg/m².   GENERAL: well developed, well nourished,in no apparent distress  SKIN: no rashes, no suspicious lesions  HEENT: atraumatic, normocephalic,ears are clear  EYES:PERRLA, EOMI,conjunctiva are clear  NECK: supple,no adenopathy, no thyromegaly  BREAST: no dominant or suspicious mass. Bilateral nipple piercings present without signs of infection.  LUNGS: clear to auscultation  CARDIO: RRR without murmur  GI: soft, no tenderness  : Deferred per patient request.   MUSCULOSKELETAL: back is not tender,FROM of the back  EXTREMITIES: no edema  NEURO: Oriented times three,cranial nerves are intact,motor and sensory are grossly intact    LABS:     Lab Results   Component Value Date    WBC 9.3 07/12/2022    RBC 4.58 05/22/2020    HGB 13.3 07/12/2022    HCT 40.3 07/12/2022    MCV 89.6 07/12/2022    MCH 27.1 05/22/2020    MCHC 31.2 05/22/2020    RDW 14.5 05/22/2020     07/12/2022      Lab Results   Component Value Date    GLU 91 07/12/2022    BUN 12 07/12/2022    BUNCREA 13.3 12/17/2020    CREATSERUM 0.73 07/12/2022    ANIONGAP 10 07/12/2022    GFR 88 06/02/2021    GFRNAA 81 12/17/2020    GFRAA 93 12/17/2020    CA 8.9 07/12/2022    OSMOCALC 283 12/17/2020    ALKPHO 72 12/17/2020    AST 15 07/12/2022    ALT 10 07/12/2022    BILT 0.2 07/12/2022    TP 7.4 07/12/2022    ALB 3.8 12/17/2020    GLOBULIN 4.5 (H) 12/17/2020     12/17/2020    K 4.3 07/12/2022     07/12/2022    CO2 24 07/12/2022      Lab Results   Component Value Date    CHOLEST 172 07/12/2022    TRIG 102 07/12/2022    HDL 61 07/12/2022    LDL 95 05/22/2020    VLDL 44 (H) 05/22/2020    TCHDLRATIO 3.00 06/02/2021    NONHDLC 109 06/02/2021      Lab Results   Component Value Date     T4F 1.3 12/17/2020    TSH 0.840 07/12/2022      Lab Results   Component Value Date     05/22/2020    A1C 5.2 05/22/2020       IMAGING:     No results found.     ASSESSMENT AND PLAN:   1. Encounter for annual physical exam  Sarah Gamboa is a 28 year old female who presents for a complete physical exam. Pap and pelvic deferred per patient request. Has never been sexually active. Continue monthly self breast exams Pt' s Body mass index is 23.82 kg/m²., recommended regular exercise. Labs ordered. vaccines reviewed.   - CBC WITH DIFFERENTIAL WITH PLATELET  - COMP METABOLIC PANEL (14)    2. Encounter for surveillance of vaginal ring hormonal contraceptive device  - stable. Continue current vaginal ring, CTM for SE.   - Etonogestrel-Ethinyl Estradiol (ELURYNG) 0.12-0.015 MG/24HR Vaginal Ring; Place 1 Ring vaginally As Directed. Unwrap and insert into the vagina as directed, leave in place for 3 weeks and remove for 1 week  Dispense: 12 each; Refill: 0    3. PCOS (polycystic ovarian syndrome)  - stable. Continue current management   - Etonogestrel-Ethinyl Estradiol (ELURYNG) 0.12-0.015 MG/24HR Vaginal Ring; Place 1 Ring vaginally As Directed. Unwrap and insert into the vagina as directed, leave in place for 3 weeks and remove for 1 week  Dispense: 12 each; Refill: 0    4. Irregular menses  - stable. Continue current management   - Etonogestrel-Ethinyl Estradiol (ELURYNG) 0.12-0.015 MG/24HR Vaginal Ring; Place 1 Ring vaginally As Directed. Unwrap and insert into the vagina as directed, leave in place for 3 weeks and remove for 1 week  Dispense: 12 each; Refill: 0    5. Screening for cholesterol level  - Lipid Panel    6. Screening for thyroid disorder  - TSH W Reflex To Free T4; Future       The patient indicates understanding of these issues and agrees to the plan.  The patient is asked to return for CPX in 1 year or sooner as needed.

## 2024-08-26 ENCOUNTER — PATIENT MESSAGE (OUTPATIENT)
Dept: INTERNAL MEDICINE CLINIC | Facility: CLINIC | Age: 29
End: 2024-08-26

## 2024-08-26 DIAGNOSIS — E66.9 OBESITY (BMI 30-39.9): Primary | ICD-10-CM

## 2024-08-27 NOTE — TELEPHONE ENCOUNTER
Requesting wegovy 1.7  LOV: 4/24/24  RTC: not noted  Last Relevant Labs: na  Filled: 7/9/24 #2ml with 0 refills  1.7 mg dose    Future Appointments   Date Time Provider Department Center   9/4/2024 12:20 PM Sun Gtz MD [facility]

## 2024-08-27 NOTE — TELEPHONE ENCOUNTER
From: Sarah Gamboa  To: Sun Gtz  Sent: 8/26/2024 5:18 PM CDT  Subject: Medication refill    Hi I am trying to get a refill on my wegovy 1.7 mg pens. I tried refilling it through MyChart but it’s sending the request to my primary care office instead of Dr. Gtz’s office. Would you guys be able to put it the medication request for me please?  Thank you!

## 2024-08-27 NOTE — TELEPHONE ENCOUNTER
From: Sarah Gamboa  To: Erica Reagan  Sent: 8/26/2024 5:22 PM CDT  Subject: Insurance documents    Hi, just wondering if the office could please complete this form for my insurance? I already had my annual physical and I completed all my labs.  Thank you!

## 2024-09-04 ENCOUNTER — OFFICE VISIT (OUTPATIENT)
Dept: INTERNAL MEDICINE CLINIC | Facility: CLINIC | Age: 29
End: 2024-09-04
Payer: COMMERCIAL

## 2024-09-04 VITALS
SYSTOLIC BLOOD PRESSURE: 122 MMHG | HEART RATE: 78 BPM | HEIGHT: 59 IN | DIASTOLIC BLOOD PRESSURE: 78 MMHG | RESPIRATION RATE: 16 BRPM | BODY MASS INDEX: 24.6 KG/M2 | WEIGHT: 122 LBS

## 2024-09-04 DIAGNOSIS — E28.2 PCOS (POLYCYSTIC OVARIAN SYNDROME): ICD-10-CM

## 2024-09-04 DIAGNOSIS — Z51.81 THERAPEUTIC DRUG MONITORING: Primary | ICD-10-CM

## 2024-09-04 DIAGNOSIS — E66.9 OBESITY (BMI 30-39.9): ICD-10-CM

## 2024-09-04 PROCEDURE — 99213 OFFICE O/P EST LOW 20 MIN: CPT | Performed by: INTERNAL MEDICINE

## 2024-09-04 PROCEDURE — 3008F BODY MASS INDEX DOCD: CPT | Performed by: INTERNAL MEDICINE

## 2024-09-04 PROCEDURE — 3078F DIAST BP <80 MM HG: CPT | Performed by: INTERNAL MEDICINE

## 2024-09-04 PROCEDURE — 3074F SYST BP LT 130 MM HG: CPT | Performed by: INTERNAL MEDICINE

## 2024-09-04 NOTE — PROGRESS NOTES
HISTORY OF PRESENT ILLNESS  Chief Complaint   Patient presents with    Weight Check     Down 17 lb     Sarah Gamboa is a 29 year old female here for follow up in medical weight loss program.     Denies chest pain, shortness of breath, dizziness, blurred vision, headache, paresthesia, nausea/vomiting.   Down 17 lb from previous   Feels cravings are normal   Feels her gut health is working the way its supposed to.   Not feeling gross after eating.   Feeling energy is much better   Metabolism is better  Feels that she is doing much better with her eating   No side effects  Reviewed role of strength training   Reviewed goal weight adjustment  Descent       Wt Readings from Last 6 Encounters:   09/04/24 122 lb (55.3 kg)   08/05/24 124 lb 6.4 oz (56.4 kg)   04/24/24 139 lb (63 kg)   02/13/24 130 lb (59 kg)   12/04/23 120 lb (54.4 kg)   08/02/23 112 lb (50.8 kg)            Breakfast Lunch Dinner Snacks Fluids   Reviewed            REVIEW OF SYSTEMS  GENERAL HEALTH: feels well otherwise, denied any fevers chills or night sweats   RESPIRATORY: denies shortness of breath   CARDIOVASCULAR: denies chest pain  GI: denies abdominal pain    EXAM  /78   Pulse 78   Resp 16   Ht 4' 11\" (1.499 m)   Wt 122 lb (55.3 kg)   LMP 08/04/2024 (Exact Date)   BMI 24.64 kg/m²   GENERAL: well developed, well nourished,in no apparent distress, A/O x3  SKIN: no rashes,no suspicious lesions  HEENT: atraumatic, normocephalic, OP-clear, PERRL  NECK: supple,no adenopathy  LUNGS: clear to auscultation bilaterally   CARDIO: RRR without murmur  GI: good BS's,NT/ND, no masses or HSM  EXTREMITIES: no cyanosis, no clubbing, no edema    Lab Results   Component Value Date    WBC 9.3 07/12/2022    RBC 4.58 05/22/2020    HGB 13.3 07/12/2022    HCT 40.3 07/12/2022    MCV 89.6 07/12/2022    MCH 27.1 05/22/2020    MCHC 31.2 05/22/2020    RDW 14.5 05/22/2020     07/12/2022     Lab Results   Component Value Date    GLU 91 07/12/2022     BUN 12 07/12/2022    BUNCREA 13.3 12/17/2020    CREATSERUM 0.73 07/12/2022    ANIONGAP 10 07/12/2022    GFR 88 06/02/2021    GFRNAA 81 12/17/2020    GFRAA 93 12/17/2020    CA 8.9 07/12/2022    OSMOCALC 283 12/17/2020    ALKPHO 72 12/17/2020    AST 15 07/12/2022    ALT 10 07/12/2022    BILT 0.2 07/12/2022    TP 7.4 07/12/2022    ALB 3.8 12/17/2020    GLOBULIN 4.5 (H) 12/17/2020     12/17/2020    K 4.3 07/12/2022     07/12/2022    CO2 24 07/12/2022     Lab Results   Component Value Date     05/22/2020    A1C 5.2 05/22/2020     Lab Results   Component Value Date    CHOLEST 172 07/12/2022    TRIG 102 07/12/2022    HDL 61 07/12/2022    LDL 95 05/22/2020    VLDL 44 (H) 05/22/2020    TCHDLRATIO 3.00 06/02/2021    NONHDLC 109 06/02/2021     Lab Results   Component Value Date    T4F 1.3 12/17/2020    TSH 0.840 07/12/2022     Lab Results   Component Value Date    B12 368 12/17/2020     Lab Results   Component Value Date    VITD 39.1 12/17/2020       Current Outpatient Medications on File Prior to Visit   Medication Sig Dispense Refill    semaglutide-weight management 1.7 MG/0.75ML Subcutaneous Solution Auto-injector Inject 0.75 mL (1.7 mg total) into the skin once a week. 3 mL 2    spironolactone 100 MG Oral Tab       Etonogestrel-Ethinyl Estradiol (ELURYNG) 0.12-0.015 MG/24HR Vaginal Ring Place 1 Ring vaginally As Directed. Unwrap and insert into the vagina as directed, leave in place for 3 weeks and remove for 1 week 12 each 0    ibuprofen 800 MG Oral Tab TAKE 1 TABLET BY MOUTH THREE TIMES DAILY WITH FOOD AND WATER when have painful menses 21 tablet 0    Multiple Vitamins-Minerals (MULTIVITAMIN ADULT) Oral Tab Take 1 tablet by mouth daily.       No current facility-administered medications on file prior to visit.       ASSESSMENT  Analyzed weight data:       Diagnoses and all orders for this visit:    Therapeutic drug monitoring    Obesity (BMI 30-39.9)    PCOS (polycystic ovarian syndrome)    Other  orders  -     semaglutide-weight management 1.7 MG/0.75ML Subcutaneous Solution Auto-injector; Inject 0.75 mL (1.7 mg total) into the skin once a week.          PLAN  Initial consult: 12/16/20: 155 lb    Down 33 lb   Continue wegovy 1.7 mg q weekly   -advised of side effects and adverse effects of this medication  Reviewed most recent labs 8/24  Increase strength training as tolerated.  -advised of side effects and adverse effects of this medication  Nutrition: low carb diet/ recommended to eat breakfast daily/ regular protein intake  Medication use and side effects reviewed with patient.  Medication contraindications: n/a  Follow up with dietitian and psychologist as recommended.  Discussed the role of sleep and stress in weight management.  Counseled on comprehensive weight loss plan including attention to nutrition, exercise and behavior/stress management for success. See patient instruction below for more details.  Discussed strategies to overcome barriers to successful weight loss and weight maintenance  FITTE: ACSM recommendations (150-300 minutes/ week in active weight loss)   Weight Loss consent to treat reviewed and signed     There are no Patient Instructions on file for this visit.    No follow-ups on file.    Patient verbalizes understanding.    Sun Gtz MD      Answers submitted by the patient for this visit:  Medical Weight Loss Follow Up (Submitted on 4/24/2024)  If greater than 5/1O how would you grade your coping mechanisms?: fair    Answers submitted by the patient for this visit:  Medical Weight Loss Follow Up (Submitted on 9/4/2024)  If greater than 5/1O how would you grade your coping mechanisms?: fair

## 2024-11-07 ENCOUNTER — HOSPITAL ENCOUNTER (OUTPATIENT)
Age: 29
Discharge: HOME | End: 2024-11-07
Payer: COMMERCIAL

## 2024-11-07 VITALS
TEMPERATURE: 98 F | RESPIRATION RATE: 18 BRPM | HEART RATE: 97 BPM | OXYGEN SATURATION: 98 % | DIASTOLIC BLOOD PRESSURE: 73 MMHG | SYSTOLIC BLOOD PRESSURE: 112 MMHG

## 2024-11-07 DIAGNOSIS — J06.9 VIRAL URI: Primary | ICD-10-CM

## 2024-11-07 LAB
POCT INFLUENZA A: NEGATIVE
POCT INFLUENZA B: NEGATIVE
S PYO AG THROAT QL: NEGATIVE
SARS-COV-2 RNA RESP QL NAA+PROBE: NOT DETECTED

## 2024-11-07 PROCEDURE — 99213 OFFICE O/P EST LOW 20 MIN: CPT | Performed by: NURSE PRACTITIONER

## 2024-11-07 PROCEDURE — 87502 INFLUENZA DNA AMP PROBE: CPT | Performed by: NURSE PRACTITIONER

## 2024-11-07 PROCEDURE — 87880 STREP A ASSAY W/OPTIC: CPT | Performed by: NURSE PRACTITIONER

## 2024-11-07 PROCEDURE — U0002 COVID-19 LAB TEST NON-CDC: HCPCS | Performed by: NURSE PRACTITIONER

## 2024-11-07 NOTE — ED PROVIDER NOTES
Patient Seen in: [facility]      History     Chief Complaint   Patient presents with    Headache    Sore Throat     Stated Complaint: sore throat    Subjective:   This is a 29-year-old female with no significant past medical history.  Presents to immediate care for sore throat, headache, body aches, and generalized abdominal pain.  Symptoms started 2 days ago.  Patient states she has low-grade fevers at home.  Did take Tylenol Cold and flu yesterday with little relief.  No vomiting or diarrhea.  No urinary symptoms.  No sick contacts.     The history is provided by the patient.             Objective:     Past Medical History:    Patient denies medical problems              Past Surgical History:   Procedure Laterality Date    Patient denies any surgical history                  Social History     Socioeconomic History    Marital status: Single   Occupational History    Occupation: student   Tobacco Use    Smoking status: Never     Passive exposure: Yes    Smokeless tobacco: Never   Vaping Use    Vaping status: Never Used   Substance and Sexual Activity    Alcohol use: No    Drug use: No    Sexual activity: Never   Other Topics Concern    Exercise Yes     Comment: 2-3x/s week    Seat Belt Yes              Review of Systems   Constitutional:  Positive for chills, fatigue and fever.   HENT:  Positive for congestion and sore throat.    Respiratory:  Negative for cough, shortness of breath and wheezing.    Cardiovascular:  Negative for chest pain, palpitations and leg swelling.   Gastrointestinal:  Positive for abdominal pain. Negative for constipation, diarrhea, nausea and vomiting.   Genitourinary:  Negative for dysuria, flank pain and hematuria.   Musculoskeletal:  Negative for back pain, neck pain and neck stiffness.   Skin:  Negative for rash.   Neurological:  Positive for headaches.       Positive for stated complaint: sore throat  Other systems are as noted in HPI.  Constitutional and vital  signs reviewed.      All other systems reviewed and negative except as noted above.    Physical Exam     ED Triage Vitals [11/07/24 1621]   /73   Pulse 97   Resp 18   Temp 97.9 °F (36.6 °C)   Temp src Temporal   SpO2 98 %   O2 Device None (Room air)       Current Vitals:   Vital Signs  BP: 112/73  Pulse: 97  Resp: 18  Temp: 97.9 °F (36.6 °C)  Temp src: Temporal    Oxygen Therapy  SpO2: 98 %  O2 Device: None (Room air)        Physical Exam  Vitals and nursing note reviewed.   Constitutional:       General: She is not in acute distress.     Appearance: Normal appearance. She is well-developed and normal weight. She is not ill-appearing, toxic-appearing or diaphoretic.   HENT:      Head: Normocephalic and atraumatic.      Right Ear: Tympanic membrane, ear canal and external ear normal. No drainage, swelling or tenderness. No middle ear effusion. Tympanic membrane is not erythematous.      Left Ear: Tympanic membrane, ear canal and external ear normal. No drainage, swelling or tenderness.  No middle ear effusion. Tympanic membrane is not erythematous.      Nose: Congestion and rhinorrhea present.      Mouth/Throat:      Mouth: Mucous membranes are moist. No oral lesions.      Pharynx: Oropharynx is clear. Posterior oropharyngeal erythema present. No pharyngeal swelling, oropharyngeal exudate or uvula swelling.      Tonsils: No tonsillar exudate or tonsillar abscesses. 0 on the right. 0 on the left.   Eyes:      General:         Right eye: No discharge.         Left eye: No discharge.      Extraocular Movements: Extraocular movements intact.      Conjunctiva/sclera: Conjunctivae normal.   Cardiovascular:      Rate and Rhythm: Normal rate and regular rhythm.      Heart sounds: Normal heart sounds. No murmur heard.  Pulmonary:      Effort: Pulmonary effort is normal. No respiratory distress.      Breath sounds: Normal breath sounds. No stridor. No wheezing, rhonchi or rales.   Musculoskeletal:      Cervical back:  Neck supple.      Right lower leg: No edema.      Left lower leg: No edema.   Skin:     General: Skin is warm and dry.      Capillary Refill: Capillary refill takes less than 2 seconds.      Findings: No rash.   Neurological:      Mental Status: She is alert and oriented to person, place, and time.   Psychiatric:         Mood and Affect: Mood normal.         Behavior: Behavior normal.             ED Course     Labs Reviewed   POCT RAPID STREP - Normal   POCT FLU TEST - Normal    Narrative:     This assay is a rapid molecular in vitro test utilizing nucleic acid amplification of influenza A and B viral RNA.   RAPID SARS-COV-2 BY PCR - Normal            Rapid flu, rapid strep, rapid covid       MDM      Vital signs stable. Patient is well-appearing and nontoxic looking. Presents to immediate care for upper respiratory symptoms.    Differential diagnosis include but not limited to COVID, viral sinusitis, influenza, other viral URI, pneumonia     Lung sounds clear bilaterally. No respiratory distress or hypoxia.  No suspicion for pneumonia.  Posterior pharynx is mildly erythematous but evidence of exudates or PTA.  Rapid flu, rapid covid, and rapid strep are all negative.     Clinically all symptoms appear viral.    DC home. Recommended over-the-counter antihistamines, flonase, and throat lozanges. Tylenol and/or ibuprofen for pain or fever. The importance of oral hydration and close follow-up with primary provider in 1 week discussed. Reasons to seek emergent treatment reinforced. Patient verbalized understanding, and agreed with plan of care. All questions answered.          Medical Decision Making      Disposition and Plan     Clinical Impression:  1. Viral URI         Disposition:  Discharge  11/7/2024  4:57 pm    Follow-up:  Hien Luz MD  [address] [address] [phone]    In 1 week            Medications Prescribed:  Discharge Medication List as of 11/7/2024  4:58 PM               Supplementary Documentation:

## 2024-11-07 NOTE — ED INITIAL ASSESSMENT (HPI)
Presents with persistent headache, body aches, abd pain, sore throat, and low grade fevers for last 2 days. Symptoms worsened last night. No known exposures to covid, strep, flu.

## 2024-11-07 NOTE — DISCHARGE INSTRUCTIONS
Continue Tylenol or ibuprofen for pain or fevers.  Throat lozenges.  Stay home when you are ill.  PCP follow-up as needed.

## 2025-01-03 DIAGNOSIS — E66.9 OBESITY (BMI 30-39.9): ICD-10-CM

## 2025-01-03 DIAGNOSIS — E28.2 PCOS (POLYCYSTIC OVARIAN SYNDROME): ICD-10-CM

## 2025-01-03 NOTE — TELEPHONE ENCOUNTER
Requesting   Requested Prescriptions     Pending Prescriptions Disp Refills    semaglutide-weight management 1.7 MG/0.75ML Subcutaneous Solution Auto-injector 3 mL 1     Sig: Inject 0.75 mL (1.7 mg total) into the skin once a week.      LOV: 09/04/24  RTC: 02/04/2025  Filled: 11/26/24 #3ml with 1 refills    Future Appointments   Date Time Provider Department Center   2/4/2025 12:00 PM Sun Gtz MD [facility]

## 2025-02-03 ENCOUNTER — HOSPITAL ENCOUNTER (OUTPATIENT)
Age: 30
Discharge: HOME | End: 2025-02-03
Payer: COMMERCIAL

## 2025-02-03 ENCOUNTER — PATIENT MESSAGE (OUTPATIENT)
Dept: INTERNAL MEDICINE CLINIC | Facility: CLINIC | Age: 30
End: 2025-02-03

## 2025-02-03 VITALS
DIASTOLIC BLOOD PRESSURE: 70 MMHG | OXYGEN SATURATION: 97 % | HEART RATE: 100 BPM | SYSTOLIC BLOOD PRESSURE: 112 MMHG | RESPIRATION RATE: 18 BRPM | TEMPERATURE: 98 F

## 2025-02-03 DIAGNOSIS — B34.9 VIRAL ILLNESS: Primary | ICD-10-CM

## 2025-02-03 DIAGNOSIS — R11.0 NAUSEA: ICD-10-CM

## 2025-02-03 DIAGNOSIS — R05.9 COUGH: ICD-10-CM

## 2025-02-03 PROCEDURE — U0002 COVID-19 LAB TEST NON-CDC: HCPCS | Performed by: NURSE PRACTITIONER

## 2025-02-03 PROCEDURE — 87502 INFLUENZA DNA AMP PROBE: CPT | Performed by: NURSE PRACTITIONER

## 2025-02-03 PROCEDURE — 87880 STREP A ASSAY W/OPTIC: CPT | Performed by: NURSE PRACTITIONER

## 2025-02-03 PROCEDURE — 99214 OFFICE O/P EST MOD 30 MIN: CPT | Performed by: NURSE PRACTITIONER

## 2025-02-03 RX ORDER — ONDANSETRON 4 MG/1
4 TABLET, ORALLY DISINTEGRATING ORAL EVERY 4 HOURS PRN
Qty: 30 TABLET | Refills: 0 | Status: SHIPPED | OUTPATIENT
Start: 2025-02-03

## 2025-02-03 NOTE — ED PROVIDER NOTES
History     Chief Complaint   Patient presents with    Cough/URI       Subjective:   HPI    Sarah Nanda Gamboa, 29 year old female with notable medical history of n/a who presents with viral illness. Patient reports s/s started a couple days ago with HA, subjective low-grade fever, intermittent nausea and emesis, but overall tolerated PO well. Denies SOB, QUINTEROS, CP, ear pain, sore throat, abdominal pain.      Patient Active Problem List   Diagnosis    Acne vulgaris    Irregular menses    Insomnia due to other mental disorder    Menometrorrhagia    PCOS (polycystic ovarian syndrome)    Anxiety      Objective:   Past Medical History:    Patient denies medical problems              Past Surgical History:   Procedure Laterality Date    Patient denies any surgical history                  Social History     Socioeconomic History    Marital status: Single   Occupational History    Occupation: student   Tobacco Use    Smoking status: Never     Passive exposure: Yes    Smokeless tobacco: Never   Vaping Use    Vaping status: Never Used   Substance and Sexual Activity    Alcohol use: No    Drug use: No    Sexual activity: Never   Other Topics Concern    Exercise Yes     Comment: 2-3x/s week    Seat Belt Yes              Medications Ordered Prior to Encounter[1]      Constitutional and vital signs reviewed.      All other systems reviewed and negative except as noted above.    I have reviewed the family history, social history, allergies, and outpatient medications.     History reviewed from EMR: Encounters, problem list, allergies, medications      Physical Exam     ED Triage Vitals [02/03/25 1512]   /70   Pulse 100   Resp 18   Temp 98.3 °F (36.8 °C)   Temp src    SpO2 97 %   O2 Device None (Room air)       Current:/70   Pulse 100   Temp 98.3 °F (36.8 °C)   Resp 18   LMP 01/27/2025 (Exact Date)   SpO2 97%       Physical Exam  Vitals and nursing note reviewed.   Constitutional:       General: She is not  in acute distress.     Appearance: Normal appearance. She is normal weight. She is not ill-appearing or toxic-appearing.   HENT:      Head: Normocephalic and atraumatic.      Right Ear: External ear normal.      Left Ear: External ear normal.      Nose: Nose normal.      Mouth/Throat:      Mouth: Mucous membranes are moist.   Eyes:      Extraocular Movements: Extraocular movements intact.      Conjunctiva/sclera: Conjunctivae normal.      Pupils: Pupils are equal, round, and reactive to light.   Cardiovascular:      Rate and Rhythm: Normal rate.      Pulses: Normal pulses.   Pulmonary:      Effort: Pulmonary effort is normal. No respiratory distress.      Comments: No distress  Musculoskeletal:         General: No swelling, tenderness or signs of injury. Normal range of motion.      Cervical back: Normal range of motion and neck supple.   Skin:     General: Skin is warm and dry.      Capillary Refill: Capillary refill takes less than 2 seconds.      Coloration: Skin is not jaundiced.   Neurological:      General: No focal deficit present.      Mental Status: She is alert and oriented to person, place, and time. Mental status is at baseline.   Psychiatric:         Mood and Affect: Mood normal.         Behavior: Behavior normal.         Thought Content: Thought content normal.         Judgment: Judgment normal.            ED Course     Labs Reviewed   POCT RAPID STREP - Normal   POCT FLU TEST - Normal    Narrative:     This assay is a rapid molecular in vitro test utilizing nucleic acid amplification of influenza A and B viral RNA.   RAPID SARS-COV-2 BY PCR - Normal     No orders to display       Vitals:    02/03/25 1512   BP: 112/70   Pulse: 100   Resp: 18   Temp: 98.3 °F (36.8 °C)   SpO2: 97%            MDM        Sarah Gamboa, 29 year old female with medical history as noted above who presents with viral illness   - Patient in NAD, VSS   - viral vs strep vs food borne vs parasitic vs other   - HPI and exam  most c/w viral etiology   - Covid, Flu, Strep swabs negative   - Supportive care and infection control measures discussed   - Work note provided   - RTED/IC precautions discussed   - Follow up with primary care provider as needed        ** See ED course below for additional information on care provided / interventions / notable events throughout patient's encounter.    ** See Home Care Instructions below for care measures to trial as applicable.         ** I have independently reviewed the radiology images, clinical lab results, and ECG tracings as described above (if applicable)    ** Concerning co-morbidities possibly affecting complaint / care: n/a    ** See disposition & plan section below for home care instructions - if applicable        Medical Decision Making  Amount and/or Complexity of Data Reviewed  Labs: ordered. Decision-making details documented in ED Course.    Risk  OTC drugs.  Prescription drug management.        Disposition and Plan     Disposition:  Discharge  2/3/2025  3:42 pm    Clinical Impression:  1. Viral illness    2. Cough    3. Nausea            Home care instructions:      Upper respiratory infection supportive care measures to try as applicable:  General:   - There are multiple viruses that cause similar symptoms, including: Influenza, Rhinovirus, Adenovirus, Coronaviruses (including Covid-19), RSV, parainfluenza, etc.   - Duration of symptoms typically depends on your body's ability to heal itself, which can be affected in many ways including metabolic health, diet, and genetics.    - Symptoms typically last between 7-days to 3-weeks   - Most medications do not not cure the illness, but may help to alleviate your symptoms. However, evidence is not strong.   - Antibiotics are NOT effective for viral illnesses   - Wash hands often   - Disinfect your environment, linens, electronics, etc. to limit viral spread   - Change toothbrush to limit viral spread   - Drink plenty of fluids (water,  Pedialyte, etc.)   - Get plenty of rest and sleep with head elevated to help with sinus drainage and throat irritation   - Do not share utensils or drinks   - Alternate Ibuprofen (adult: 600mg) and Tylenol (adult: 650-1000mg) as needed for pain / body aches / fever   - Take a multivitamin and extra Vitamin D (~2000IU) daily, year round   - Prophylactic Vitamin C can help reduce symptoms if you become infected, but is less effective when already ill   - You may benefit from Zinc (~20mg) every day, or every other other day, for a week while sick (Zinc has been shown to kill respiratory viruses)    Sore throat:   - Salt water gargles throughout the day for sore throat   - Cepacol or Ricola lozenges as needed for sore throat    Cough / Sinus:   - Using saline spray or a couple drops into nostrils a couple times a day can help with sinus inflammation (Use nasal spray with nose forward and applicator tip pointed towards outside of eye. Breath normally. You should not feel medication go down your throat.)   - Avoid having air blow on your face as this can worsen congestion / cough   - You may benefit from placing a garlic clove in each nostril for 10-15min which should irritate sinuses, causing you to get rid of stuck mucus. Blow your nose thoroughly afterwards   - You may benefit from spoonfuls of honey (and/or added to warm drinks) throughout the day for cough   - You may benefit from taking a decongestant (e.g. Sudafed - pseudoephedrine [behind the pharmacy counter]) (may temporarily elevate your heart rate and blood pressure)   - You may benefit from cough medication containing \"Dextromethorphan\" (e.g. Delsym)   - You may benefit from using a humidifier and/or steam showers   - You may benefit from Flonase nasal spray daily (Use with head tilted down and tip pointed towards outside of eye. Breath normally. You should not feel medication go down your throat)   - You may benefit from taking a daily allergy medication  (e.g. Zyrtec, Xyzal, etc.)   - You may benefit from boiling water with lemon and cayenne pepper, then breathing in the steam (you can cover your head with a towel to help funnel the steam)        Follow-up:  Hien Luz MD  [address] [facility]  [address]  [facility] [id number] [phone]      As needed          Medications Prescribed:  Discharge Medication List as of 2/3/2025  3:47 PM        START taking these medications    Details   ondansetron 4 MG Oral Tablet Dispersible Take 1 tablet (4 mg total) by mouth every 4 (four) hours as needed for Nausea., Normal, Disp-30 tablet, R-0               Naveed Emery, DNP, APRN, AGACNP-BC, FNP-C, CNL  Adult-Gerontology Acute Care & Family Nurse Practitioner  [facility]      The above patient (and/or guardian) was made aware that an appropriate evaluation has been performed, and that no additional testing is required at this time. In my medical judgment, there is currently no evidence of an immediate life-threatening or surgical condition, therefore discharge is indicated at this time. The patient (and/or guardian) was advised that a small risk still exists that a serious condition could develop. The patient was instructed to arrange close follow-up with their primary care provider (or the referral provider given today). The patient received written and verbal instructions regarding their condition / concerns, demonstrated understanding, and is agreement with the outpatient treatment plan.              [1]   No current facility-administered medications on file prior to encounter.     Current Outpatient Medications on File Prior to Encounter   Medication Sig Dispense Refill    semaglutide-weight management 1.7 MG/0.75ML Subcutaneous Solution Auto-injector Inject 0.75 mL (1.7 mg total) into the skin once a week. 3 mL 1    spironolactone 100 MG Oral Tab       Etonogestrel-Ethinyl Estradiol (ELURYNG) 0.12-0.015 MG/24HR Vaginal Ring Place 1 Ring vaginally As  Directed. Unwrap and insert into the vagina as directed, leave in place for 3 weeks and remove for 1 week 12 each 0    Multiple Vitamins-Minerals (MULTIVITAMIN ADULT) Oral Tab Take 1 tablet by mouth daily.      ibuprofen 800 MG Oral Tab TAKE 1 TABLET BY MOUTH THREE TIMES DAILY WITH FOOD AND WATER when have painful menses 21 tablet 0

## 2025-02-03 NOTE — ED INITIAL ASSESSMENT (HPI)
Pt began 2 days ago with headache, lo grade fever, some nausea and vomiting with no diarrhea, she is congested and has a cough and sore throat

## 2025-02-03 NOTE — DISCHARGE INSTRUCTIONS
Upper respiratory infection supportive care measures to try as applicable:  General:   - There are multiple viruses that cause similar symptoms, including: Influenza, Rhinovirus, Adenovirus, Coronaviruses (including Covid-19), RSV, parainfluenza, etc.   - Duration of symptoms typically depends on your body's ability to heal itself, which can be affected in many ways including metabolic health, diet, and genetics.    - Symptoms typically last between 7-days to 3-weeks   - Most medications do not not cure the illness, but may help to alleviate your symptoms. However, evidence is not strong.   - Antibiotics are NOT effective for viral illnesses   - Wash hands often   - Disinfect your environment, linens, electronics, etc. to limit viral spread   - Change toothbrush to limit viral spread   - Drink plenty of fluids (water, Pedialyte, etc.)   - Get plenty of rest and sleep with head elevated to help with sinus drainage and throat irritation   - Do not share utensils or drinks   - Alternate Ibuprofen (adult: 600mg) and Tylenol (adult: 650-1000mg) as needed for pain / body aches / fever   - Take a multivitamin and extra Vitamin D (~2000IU) daily, year round   - Prophylactic Vitamin C can help reduce symptoms if you become infected, but is less effective when already ill   - You may benefit from Zinc (~20mg) every day, or every other other day, for a week while sick (Zinc has been shown to kill respiratory viruses)    Sore throat:   - Salt water gargles throughout the day for sore throat   - Cepacol or Ricola lozenges as needed for sore throat    Cough / Sinus:   - Using saline spray or a couple drops into nostrils a couple times a day can help with sinus inflammation (Use nasal spray with nose forward and applicator tip pointed towards outside of eye. Breath normally. You should not feel medication go down your throat.)   - Avoid having air blow on your face as this can worsen congestion / cough   - You may benefit from  placing a garlic clove in each nostril for 10-15min which should irritate sinuses, causing you to get rid of stuck mucus. Blow your nose thoroughly afterwards   - You may benefit from spoonfuls of honey (and/or added to warm drinks) throughout the day for cough   - You may benefit from taking a decongestant (e.g. Sudafed - pseudoephedrine [behind the pharmacy counter]) (may temporarily elevate your heart rate and blood pressure)   - You may benefit from cough medication containing \"Dextromethorphan\" (e.g. Delsym)   - You may benefit from using a humidifier and/or steam showers   - You may benefit from Flonase nasal spray daily (Use with head tilted down and tip pointed towards outside of eye. Breath normally. You should not feel medication go down your throat)   - You may benefit from taking a daily allergy medication (e.g. Zyrtec, Xyzal, etc.)   - You may benefit from boiling water with lemon and cayenne pepper, then breathing in the steam (you can cover your head with a towel to help funnel the steam)

## 2025-02-04 ENCOUNTER — OFFICE VISIT (OUTPATIENT)
Dept: INTERNAL MEDICINE CLINIC | Facility: CLINIC | Age: 30
End: 2025-02-04
Payer: COMMERCIAL

## 2025-02-04 VITALS
HEIGHT: 59 IN | DIASTOLIC BLOOD PRESSURE: 78 MMHG | BODY MASS INDEX: 22.98 KG/M2 | HEART RATE: 88 BPM | SYSTOLIC BLOOD PRESSURE: 120 MMHG | RESPIRATION RATE: 22 BRPM | WEIGHT: 114 LBS

## 2025-02-04 DIAGNOSIS — E28.2 PCOS (POLYCYSTIC OVARIAN SYNDROME): ICD-10-CM

## 2025-02-04 DIAGNOSIS — E66.9 OBESITY (BMI 30-39.9): ICD-10-CM

## 2025-02-04 DIAGNOSIS — Z51.81 THERAPEUTIC DRUG MONITORING: Primary | ICD-10-CM

## 2025-02-04 PROCEDURE — 3074F SYST BP LT 130 MM HG: CPT | Performed by: INTERNAL MEDICINE

## 2025-02-04 PROCEDURE — 99213 OFFICE O/P EST LOW 20 MIN: CPT | Performed by: INTERNAL MEDICINE

## 2025-02-04 PROCEDURE — 3078F DIAST BP <80 MM HG: CPT | Performed by: INTERNAL MEDICINE

## 2025-02-04 PROCEDURE — 3008F BODY MASS INDEX DOCD: CPT | Performed by: INTERNAL MEDICINE

## 2025-02-04 NOTE — PROGRESS NOTES
HISTORY OF PRESENT ILLNESS  Chief Complaint   Patient presents with    Weight Check     Down 8      Sarah Gamboa is a 29 year old female here for follow up in medical weight loss program.     Denies chest pain, shortness of breath, dizziness, blurred vision, headache, paresthesia, nausea/vomiting.   Struggling with work and home stress  Does snack more at work   Snacking more often 2-3   At home : not struggling with hunger and does small meals       [facility] Follow Up    General Information  Nutrition Recall  Exercise     Sleep                  Wt Readings from Last 6 Encounters:   02/04/25 114 lb (51.7 kg)   09/04/24 122 lb (55.3 kg)   08/05/24 124 lb 6.4 oz (56.4 kg)   04/24/24 139 lb (63 kg)   02/13/24 130 lb (59 kg)   12/04/23 120 lb (54.4 kg)            Breakfast Lunch Dinner Snacks Fluids   Reviewed            REVIEW OF SYSTEMS  GENERAL HEALTH: feels well otherwise, denied any fevers chills or night sweats   RESPIRATORY: denies shortness of breath   CARDIOVASCULAR: denies chest pain  GI: denies abdominal pain    EXAM  /78   Pulse 88   Resp 22   Ht 4' 11\" (1.499 m)   Wt 114 lb (51.7 kg)   LMP 01/27/2025 (Exact Date)   BMI 23.03 kg/m²   GENERAL: well developed, well nourished,in no apparent distress, A/O x3  SKIN: no rashes,no suspicious lesions  HEENT: atraumatic, normocephalic, OP-clear, PERRL  NECK: supple,no adenopathy  LUNGS: clear to auscultation bilaterally   CARDIO: RRR without murmur  GI: good BS's,NT/ND, no masses or HSM  EXTREMITIES: no cyanosis, no clubbing, no edema    Lab Results   Component Value Date    WBC 9.3 07/12/2022    RBC 4.58 05/22/2020    HGB 13.3 07/12/2022    HCT 40.3 07/12/2022    MCV 89.6 07/12/2022    MCH 27.1 05/22/2020    MCHC 31.2 05/22/2020    RDW 14.5 05/22/2020     07/12/2022     Lab Results   Component Value Date    GLU 91 07/12/2022    BUN 12 07/12/2022    BUNCREA 13.3 12/17/2020    CREATSERUM 0.73 07/12/2022    ANIONGAP 10 07/12/2022    GFR 88  06/02/2021    GFRNAA 81 12/17/2020    GFRAA 93 12/17/2020    CA 8.9 07/12/2022    OSMOCALC 283 12/17/2020    ALKPHO 72 12/17/2020    AST 15 07/12/2022    ALT 10 07/12/2022    BILT 0.2 07/12/2022    TP 7.4 07/12/2022    ALB 3.8 12/17/2020    GLOBULIN 4.5 (H) 12/17/2020     12/17/2020    K 4.3 07/12/2022     07/12/2022    CO2 24 07/12/2022     Lab Results   Component Value Date     05/22/2020    A1C 5.2 05/22/2020     Lab Results   Component Value Date    CHOLEST 172 07/12/2022    TRIG 102 07/12/2022    HDL 61 07/12/2022    LDL 95 05/22/2020    VLDL 44 (H) 05/22/2020    TCHDLRATIO 3.00 06/02/2021    NONHDLC 109 06/02/2021     Lab Results   Component Value Date    T4F 1.3 12/17/2020    TSH 0.840 07/12/2022     Lab Results   Component Value Date    B12 368 12/17/2020     Lab Results   Component Value Date    VITD 39.1 12/17/2020       Current Outpatient Medications on File Prior to Visit   Medication Sig Dispense Refill    ondansetron 4 MG Oral Tablet Dispersible Take 1 tablet (4 mg total) by mouth every 4 (four) hours as needed for Nausea. 30 tablet 0    spironolactone 100 MG Oral Tab       Etonogestrel-Ethinyl Estradiol (ELURYNG) 0.12-0.015 MG/24HR Vaginal Ring Place 1 Ring vaginally As Directed. Unwrap and insert into the vagina as directed, leave in place for 3 weeks and remove for 1 week 12 each 0    ibuprofen 800 MG Oral Tab TAKE 1 TABLET BY MOUTH THREE TIMES DAILY WITH FOOD AND WATER when have painful menses 21 tablet 0    Multiple Vitamins-Minerals (MULTIVITAMIN ADULT) Oral Tab Take 1 tablet by mouth daily.       No current facility-administered medications on file prior to visit.       ASSESSMENT  Analyzed weight data:       Diagnoses and all orders for this visit:    Therapeutic drug monitoring  -     semaglutide-weight management 1.7 MG/0.75ML Subcutaneous Solution Auto-injector; Inject 0.75 mL (1.7 mg total) into the skin once a week.    Obesity (BMI 30-39.9)  -     semaglutide-weight  management 1.7 MG/0.75ML Subcutaneous Solution Auto-injector; Inject 0.75 mL (1.7 mg total) into the skin once a week.    PCOS (polycystic ovarian syndrome)  -     semaglutide-weight management 1.7 MG/0.75ML Subcutaneous Solution Auto-injector; Inject 0.75 mL (1.7 mg total) into the skin once a week.    BMI 32.0-32.9,adult  -     semaglutide-weight management 1.7 MG/0.75ML Subcutaneous Solution Auto-injector; Inject 0.75 mL (1.7 mg total) into the skin once a week.          PLAN  Initial consult: 12/16/20: 155 lb    Down 41 lb   Continue wegovy 1.7 mg q weekly   -advised of side effects and adverse effects of this medication  Reviewed most recent labs 8/24  Increase strength training as tolerated.  -advised of side effects and adverse effects of this medication  Nutrition: low carb diet/ recommended to eat breakfast daily/ regular protein intake  Medication use and side effects reviewed with patient.  Medication contraindications: n/a  Follow up with dietitian and psychologist as recommended.  Discussed the role of sleep and stress in weight management.  Counseled on comprehensive weight loss plan including attention to nutrition, exercise and behavior/stress management for success. See patient instruction below for more details.  Discussed strategies to overcome barriers to successful weight loss and weight maintenance  FITTE: ACSM recommendations (150-300 minutes/ week in active weight loss)   Weight Loss consent to treat reviewed and signed     There are no Patient Instructions on file for this visit.    No follow-ups on file.    Patient verbalizes understanding.    Sun Gtz MD

## 2025-03-21 ENCOUNTER — PATIENT MESSAGE (OUTPATIENT)
Dept: INTERNAL MEDICINE CLINIC | Facility: CLINIC | Age: 30
End: 2025-03-21

## 2025-03-21 DIAGNOSIS — F51.01 PRIMARY INSOMNIA: ICD-10-CM

## 2025-03-24 RX ORDER — TRAZODONE HYDROCHLORIDE 100 MG/1
100 TABLET ORAL NIGHTLY
Qty: 90 TABLET | Refills: 0 | Status: SHIPPED | OUTPATIENT
Start: 2025-03-24

## 2025-03-24 RX ORDER — TRAZODONE HYDROCHLORIDE 50 MG/1
50 TABLET ORAL NIGHTLY PRN
Qty: 30 TABLET | Refills: 0 | OUTPATIENT
Start: 2025-03-24

## 2025-04-01 DIAGNOSIS — F51.01 PRIMARY INSOMNIA: ICD-10-CM

## 2025-04-02 RX ORDER — TRAZODONE HYDROCHLORIDE 50 MG/1
50 TABLET ORAL NIGHTLY PRN
Qty: 30 TABLET | Refills: 0 | OUTPATIENT
Start: 2025-04-02

## 2025-05-07 ENCOUNTER — TELEMEDICINE (OUTPATIENT)
Dept: INTERNAL MEDICINE CLINIC | Facility: CLINIC | Age: 30
End: 2025-05-07
Payer: COMMERCIAL

## 2025-05-07 DIAGNOSIS — N92.6 IRREGULAR MENSES: ICD-10-CM

## 2025-05-07 DIAGNOSIS — N92.1 MENOMETRORRHAGIA: ICD-10-CM

## 2025-05-07 DIAGNOSIS — E66.9 OBESITY (BMI 30-39.9): ICD-10-CM

## 2025-05-07 DIAGNOSIS — E28.2 PCOS (POLYCYSTIC OVARIAN SYNDROME): ICD-10-CM

## 2025-05-07 DIAGNOSIS — Z51.81 THERAPEUTIC DRUG MONITORING: Primary | ICD-10-CM

## 2025-05-07 NOTE — PROGRESS NOTES
HISTORY OF PRESENT ILLNESS  Chief Complaint   Patient presents with    Weight Check     Video         Sarah Gamboa is a 29 year old female here for follow up in medical weight loss program.     Denies chest pain, shortness of breath, dizziness, blurred vision, headache, paresthesia, nausea/vomiting.   Weight at 118 lb   Weight continues to fluctuate up and down   Sometimes feels medication does not help   Feels weight retention up and down   Trying to be as active as she can   Struggling with regular physical activity   Sometimes helps but not consistent   Not noticing cravings        Wt Readings from Last 6 Encounters:   02/04/25 114 lb (51.7 kg)   09/04/24 122 lb (55.3 kg)   08/05/24 124 lb 6.4 oz (56.4 kg)   04/24/24 139 lb (63 kg)   02/13/24 130 lb (59 kg)   12/04/23 120 lb (54.4 kg)          [facility] Follow Up    General Information  Nutrition Recall  Breakfast: Orange juice 4oz, 1 banana Lunch: Chicken noodle soup 1.5 cup   Dinner: Chicken noodle soup 1.5cup     Dining Out: 2   Exercise   Patient stated exercises # days/week: 2  Patient stated perceived level of   exertion: 3 Anaerobic Days: 2   Aerobic Days: 2   Patient stated average level of stress: 8  Sleep   Patient stated # hours uninterrupted sleep: 4   Patient stated feels   restful: No      Cause of disruption of sleep: Have always had a problem with insomnia,   will wake up at least once a night              Breakfast Lunch Dinner Snacks Fluids   Reviewed           REVIEW OF SYSTEMS  GENERAL HEALTH: feels well otherwise, denied any fevers chills or night sweats   RESPIRATORY: denies shortness of breath   CARDIOVASCULAR: denies chest pain  GI: denies abdominal pain    EXAM  LMP 01/27/2025 (Exact Date)   GENERAL: well developed, well nourished,in no apparent distress, A/O x3  SKIN: no rashes,no suspicious lesions  HEENT: atraumatic, normocephalic, OP-clear, PERRL  NECK: supple,no adenopathy  LUNGS: clear to auscultation bilaterally   CARDIO: RRR  without murmur  GI: good BS's,NT/ND, no masses or HSM  EXTREMITIES: no cyanosis, no clubbing, no edema    Lab Results   Component Value Date    WBC 9.3 07/12/2022    RBC 4.58 05/22/2020    HGB 13.3 07/12/2022    HCT 40.3 07/12/2022    MCV 89.6 07/12/2022    MCH 27.1 05/22/2020    MCHC 31.2 05/22/2020    RDW 14.5 05/22/2020     07/12/2022     Lab Results   Component Value Date    GLU 91 07/12/2022    BUN 12 07/12/2022    BUNCREA 13.3 12/17/2020    CREATSERUM 0.73 07/12/2022    ANIONGAP 10 07/12/2022    GFR 88 06/02/2021    GFRNAA 81 12/17/2020    GFRAA 93 12/17/2020    CA 8.9 07/12/2022    OSMOCALC 283 12/17/2020    ALKPHO 72 12/17/2020    AST 15 07/12/2022    ALT 10 07/12/2022    BILT 0.2 07/12/2022    TP 7.4 07/12/2022    ALB 3.8 12/17/2020    GLOBULIN 4.5 (H) 12/17/2020     12/17/2020    K 4.3 07/12/2022     07/12/2022    CO2 24 07/12/2022     Lab Results   Component Value Date     05/22/2020    A1C 5.2 05/22/2020     Lab Results   Component Value Date    CHOLEST 172 07/12/2022    TRIG 102 07/12/2022    HDL 61 07/12/2022    LDL 95 05/22/2020    VLDL 44 (H) 05/22/2020    TCHDLRATIO 3.00 06/02/2021    NONHDLC 109 06/02/2021     Lab Results   Component Value Date    T4F 1.3 12/17/2020    TSH 0.840 07/12/2022     Lab Results   Component Value Date    B12 368 12/17/2020     Lab Results   Component Value Date    VITD 39.1 12/17/2020       Medications Ordered Prior to Encounter[1]    ASSESSMENT  Analyzed weight data:       Diagnoses and all orders for this visit:    Therapeutic drug monitoring  -     Testosterone,Free And Total (Female/Child) [E]; Future  -     Dehydroepiandrosterone Sulfate [E]; Future  -     Cortisol [E]  -     Cortisol [E]  -     Cortisol [E]  -     Cortisol [E]  -     Homocysteine [E]; Future    Obesity (BMI 30-39.9)  -     Testosterone,Free And Total (Female/Child) [E]; Future  -     Dehydroepiandrosterone Sulfate [E]; Future  -     Cortisol [E]  -     Cortisol [E]  -      Cortisol [E]  -     Cortisol [E]  -     Homocysteine [E]; Future    PCOS (polycystic ovarian syndrome)  -     Testosterone,Free And Total (Female/Child) [E]; Future  -     Dehydroepiandrosterone Sulfate [E]; Future  -     Cortisol [E]  -     Cortisol [E]  -     Cortisol [E]  -     Cortisol [E]  -     Homocysteine [E]; Future    Menometrorrhagia  -     Cortisol [E]  -     Cortisol [E]  -     Cortisol [E]  -     Cortisol [E]  -     Homocysteine [E]; Future    Irregular menses  -     Cortisol [E]  -     Cortisol [E]  -     Cortisol [E]  -     Cortisol [E]  -     Homocysteine [E]; Future        PLAN  Increase wegovy to 2.4 mg weekly   Total time spent on chart review, pre-charting, obtaining history, counseling, and educating, reviewing labs was 30 minutes.    -advised of side effects and adverse effects of this medication  Reviewed lifestyle goals and adjustment   Reviewed metabolic health and PCOs and its role in weight loss.   Reviewed Dhea-s, 4 point salivary test and testosterone testing , homocysteine  Nutrition: low carb diet/ recommended to eat breakfast daily/ regular protein intake  Medication use and side effects reviewed with patient.  Medication contraindications: reviewed  Follow up with dietitian and psychologist as recommended.  Discussed the role of sleep and stress in weight management.  Counseled on comprehensive weight loss plan including attention to nutrition, exercise and behavior/stress management for success. See patient instruction below for more details.  Discussed strategies to overcome barriers to successful weight loss and weight maintenance  FITTE: ACSM recommendations (150-300 minutes/ week in active weight loss)   Weight Loss consent to treat reviewed and signed    There are no Patient Instructions on file for this visit.    No follow-ups on file.    Patient verbalizes understanding.    Sun Gtz MD           [1]   Current Outpatient Medications on File Prior to Visit   Medication Sig  Dispense Refill    ondansetron 4 MG Oral Tablet Dispersible Take 1 tablet (4 mg total) by mouth every 8 (eight) hours as needed for Nausea. 30 tablet 0    traZODone 100 MG Oral Tab Take 1 tablet (100 mg total) by mouth nightly. 90 tablet 0    fluticasone propionate 50 MCG/ACT Nasal Suspension every 28 days. FOR 21 DAYS IN, THEN REMOVE FOR 7 DAYS      azelastine 137 MCG/SPRAY Nasal Solution SPRAY 2 SPRAYS INTO EACH NOSTRIL IN THE MORNING AND BEFORE BEDTIME AS DIRECTED      predniSONE 10 MG Oral Tab every 28 days. FOR 21 DAYS IN, THEN REMOVE FOR 7 DAYS      semaglutide-weight management 1.7 MG/0.75ML Subcutaneous Solution Auto-injector Inject 0.75 mL (1.7 mg total) into the skin once a week. 3 mL 3    spironolactone 100 MG Oral Tab       Etonogestrel-Ethinyl Estradiol (ELURYNG) 0.12-0.015 MG/24HR Vaginal Ring Place 1 Ring vaginally As Directed. Unwrap and insert into the vagina as directed, leave in place for 3 weeks and remove for 1 week 12 each 0    ibuprofen 800 MG Oral Tab TAKE 1 TABLET BY MOUTH THREE TIMES DAILY WITH FOOD AND WATER when have painful menses 21 tablet 0    Multiple Vitamins-Minerals (MULTIVITAMIN ADULT) Oral Tab Take 1 tablet by mouth daily.       No current facility-administered medications on file prior to visit.

## 2025-05-12 ENCOUNTER — PATIENT MESSAGE (OUTPATIENT)
Dept: INTERNAL MEDICINE CLINIC | Facility: CLINIC | Age: 30
End: 2025-05-12

## 2025-05-28 ENCOUNTER — PATIENT MESSAGE (OUTPATIENT)
Dept: INTERNAL MEDICINE CLINIC | Facility: CLINIC | Age: 30
End: 2025-05-28

## 2025-05-28 NOTE — TELEPHONE ENCOUNTER
Requesting Increase   Requested Prescriptions     Pending Prescriptions Disp Refills    semaglutide-weight management 2.4 MG/0.75ML Subcutaneous Solution Auto-injector 3 mL 0     Sig: Inject 0.75 mL (2.4 mg total) into the skin once a week for 4 doses.      LOV: 02/04/25  RTC: 08/26/25  Filled: 02/04/25 #3 with 3 refills    Future Appointments   Date Time Provider Department Center   8/26/2025 11:40 AM Sun Gtz MD [facility]

## 2025-05-29 RX ORDER — ONDANSETRON 4 MG/1
4 TABLET, ORALLY DISINTEGRATING ORAL EVERY 8 HOURS PRN
Qty: 30 TABLET | Refills: 0 | Status: SHIPPED | OUTPATIENT
Start: 2025-05-29

## 2025-05-29 NOTE — TELEPHONE ENCOUNTER
Last OV relevant to medication: 3/5/25  Last refill date: 4/10/25 #30/refills: 0  When pt was asked to return for OV: August for annual physical exam   Upcoming appt/reason:   Future Appointments   Date Time Provider Department Center   8/26/2025 11:40 AM Sun Gtz MD [facility]   Was pt informed of any over due labs: due for annual physical exam

## 2025-06-23 ENCOUNTER — PATIENT MESSAGE (OUTPATIENT)
Dept: INTERNAL MEDICINE CLINIC | Facility: CLINIC | Age: 30
End: 2025-06-23

## 2025-06-24 NOTE — TELEPHONE ENCOUNTER
Requesting   Requested Prescriptions     Pending Prescriptions Disp Refills    semaglutide-weight management 2.4 MG/0.75ML Subcutaneous Solution Auto-injector 3 mL 0     Sig: Inject 0.75 mL (2.4 mg total) into the skin once a week for 4 doses.      LOV: 02/04/25  RTC: 08/26/25  Filled: 05/28/25 #3 with 0 refills    Future Appointments   Date Time Provider Department Center   8/26/2025 11:40 AM Sun Gtz MD [facility]

## 2025-06-30 RX ORDER — TRAZODONE HYDROCHLORIDE 100 MG/1
100 TABLET ORAL NIGHTLY
Qty: 90 TABLET | Refills: 0 | Status: SHIPPED | OUTPATIENT
Start: 2025-06-30

## 2025-06-30 NOTE — TELEPHONE ENCOUNTER
Last OV relevant to medication: 3/5/25 insomnia, 8/5/24 physical  Last refill date: 3/24/25     #/refills: 90  When pt was asked to return for OV: \"when routine care due\"  Upcoming appt/reason: nope  Was pt informed of any over due labs: OVERDUE see my chart message

## 2025-07-21 ENCOUNTER — PATIENT MESSAGE (OUTPATIENT)
Dept: INTERNAL MEDICINE CLINIC | Facility: CLINIC | Age: 30
End: 2025-07-21

## 2025-07-21 RX ORDER — SEMAGLUTIDE 2.4 MG/.75ML
2.4 INJECTION, SOLUTION SUBCUTANEOUS
Qty: 3 ML | Refills: 2 | Status: SHIPPED | OUTPATIENT
Start: 2025-07-21

## 2025-07-21 NOTE — TELEPHONE ENCOUNTER
Requesting   Requested Prescriptions     Pending Prescriptions Disp Refills    WEGOVY 2.4 MG/0.75ML Subcutaneous Solution Auto-injector [Pharmacy Med Name: WEGOVY 2.4 MG/0.75 ML PEN]  0     Sig: INJECT 0.75 ML (2.4 MG TOTAL) INTO THE SKIN ONCE A WEEK FOR 4 DOSES.      LOV: 02/04/25  RTC: 08/26/25  Filled: 06/24/25 #3 with 0 refills    Future Appointments   Date Time Provider Department Center   8/6/2025  3:00 PM Erica Reagan APRN [facility]   8/26/2025 11:40 AM Sun Gtz MD [facility]

## 2025-08-06 ENCOUNTER — OFFICE VISIT (OUTPATIENT)
Dept: INTERNAL MEDICINE CLINIC | Facility: CLINIC | Age: 30
End: 2025-08-06

## 2025-08-06 VITALS
SYSTOLIC BLOOD PRESSURE: 112 MMHG | HEIGHT: 57.09 IN | OXYGEN SATURATION: 97 % | BODY MASS INDEX: 24.62 KG/M2 | TEMPERATURE: 97 F | WEIGHT: 114.13 LBS | RESPIRATION RATE: 16 BRPM | HEART RATE: 100 BPM | DIASTOLIC BLOOD PRESSURE: 70 MMHG

## 2025-08-06 DIAGNOSIS — E28.2 PCOS (POLYCYSTIC OVARIAN SYNDROME): ICD-10-CM

## 2025-08-06 DIAGNOSIS — Z13.220 SCREENING FOR CHOLESTEROL LEVEL: ICD-10-CM

## 2025-08-06 DIAGNOSIS — R00.2 PALPITATIONS: ICD-10-CM

## 2025-08-06 DIAGNOSIS — Z13.29 SCREENING FOR THYROID DISORDER: ICD-10-CM

## 2025-08-06 DIAGNOSIS — Z00.00 ENCOUNTER FOR ANNUAL PHYSICAL EXAM: Primary | ICD-10-CM

## 2025-08-06 DIAGNOSIS — R11.0 NAUSEA: ICD-10-CM

## 2025-08-06 DIAGNOSIS — Z30.44 ENCOUNTER FOR SURVEILLANCE OF VAGINAL RING HORMONAL CONTRACEPTIVE DEVICE: ICD-10-CM

## 2025-08-06 DIAGNOSIS — N92.6 IRREGULAR MENSES: ICD-10-CM

## 2025-08-06 DIAGNOSIS — G43.009 MIGRAINE WITHOUT AURA AND WITHOUT STATUS MIGRAINOSUS, NOT INTRACTABLE: ICD-10-CM

## 2025-08-06 DIAGNOSIS — F51.01 PRIMARY INSOMNIA: ICD-10-CM

## 2025-08-06 RX ORDER — CETIRIZINE HYDROCHLORIDE 10 MG/1
TABLET ORAL
COMMUNITY

## 2025-08-06 RX ORDER — ETONOGESTREL AND ETHINYL ESTRADIOL VAGINAL RING .015; .12 MG/D; MG/D
1 RING VAGINAL AS DIRECTED
Qty: 12 EACH | Refills: 3 | Status: SHIPPED | OUTPATIENT
Start: 2025-08-06

## 2025-08-06 RX ORDER — ONDANSETRON 4 MG/1
4 TABLET, ORALLY DISINTEGRATING ORAL EVERY 8 HOURS PRN
Qty: 30 TABLET | Refills: 0 | Status: SHIPPED | OUTPATIENT
Start: 2025-08-06

## 2025-08-06 RX ORDER — TRAZODONE HYDROCHLORIDE 100 MG/1
100 TABLET ORAL NIGHTLY
Qty: 90 TABLET | Refills: 0 | Status: SHIPPED | OUTPATIENT
Start: 2025-08-06

## 2025-08-06 RX ORDER — VIT C/HESPERIDIN/BIOFLAVONOIDS 500-100 MG
30 TABLET ORAL DAILY
COMMUNITY

## 2025-08-18 DIAGNOSIS — Z30.44 ENCOUNTER FOR SURVEILLANCE OF VAGINAL RING HORMONAL CONTRACEPTIVE DEVICE: ICD-10-CM

## 2025-08-18 DIAGNOSIS — N92.6 IRREGULAR MENSES: ICD-10-CM

## 2025-08-18 DIAGNOSIS — E28.2 PCOS (POLYCYSTIC OVARIAN SYNDROME): ICD-10-CM

## 2025-08-20 RX ORDER — ETONOGESTREL/ETHINYL ESTRADIOL .12-.015MG
RING, VAGINAL VAGINAL
Qty: 1 EACH | Refills: 9 | OUTPATIENT
Start: 2025-08-20

## 2025-08-20 RX ORDER — ETONOGESTREL AND ETHINYL ESTRADIOL VAGINAL RING .015; .12 MG/D; MG/D
RING VAGINAL
Qty: 10 EACH | Refills: 0 | OUTPATIENT
Start: 2025-08-20

## 2025-08-26 ENCOUNTER — OFFICE VISIT (OUTPATIENT)
Dept: INTERNAL MEDICINE CLINIC | Facility: CLINIC | Age: 30
End: 2025-08-26

## 2025-08-26 VITALS
BODY MASS INDEX: 23.39 KG/M2 | HEIGHT: 59 IN | DIASTOLIC BLOOD PRESSURE: 78 MMHG | WEIGHT: 116 LBS | SYSTOLIC BLOOD PRESSURE: 118 MMHG | HEART RATE: 89 BPM | RESPIRATION RATE: 20 BRPM

## 2025-08-26 DIAGNOSIS — E28.2 PCOS (POLYCYSTIC OVARIAN SYNDROME): ICD-10-CM

## 2025-08-26 DIAGNOSIS — R74.8 ELEVATED LIVER ENZYMES: ICD-10-CM

## 2025-08-26 DIAGNOSIS — Z51.81 THERAPEUTIC DRUG MONITORING: Primary | ICD-10-CM

## 2025-08-26 DIAGNOSIS — E66.9 OBESITY (BMI 30-39.9): ICD-10-CM

## 2025-08-26 PROCEDURE — 3078F DIAST BP <80 MM HG: CPT | Performed by: INTERNAL MEDICINE

## 2025-08-26 PROCEDURE — 99213 OFFICE O/P EST LOW 20 MIN: CPT | Performed by: INTERNAL MEDICINE

## 2025-08-26 PROCEDURE — 3008F BODY MASS INDEX DOCD: CPT | Performed by: INTERNAL MEDICINE

## 2025-08-26 PROCEDURE — 3074F SYST BP LT 130 MM HG: CPT | Performed by: INTERNAL MEDICINE

## 2025-08-26 RX ORDER — METFORMIN HYDROCHLORIDE 750 MG/1
750 TABLET, EXTENDED RELEASE ORAL DAILY
Qty: 30 TABLET | Refills: 2 | Status: SHIPPED | OUTPATIENT
Start: 2025-08-26

## (undated) NOTE — ED AVS SNAPSHOT
[facility] in [address] [place] [address] Drive,[address]    [address] [place] [address]    Phone:  [phone]    Fax:  [phone]   MRN: [id number]    Department:  [facility]   Date of Visit:  1/16/2017 Discharge Instructions       Take the Z-Hermes as directed with food once daily for the next 5 days. Use the Flonase 2 sprays each nostril once daily after shower for at least the next 2 weeks or as long as you are congested. You may take 5-10 mL of Cheratuss receive this, we would really appreciate it if you could take the time to complete it. Thank you! You were examined and treated today on an urgent basis only. This was not a substitute for ongoing medical care.  Often, one Immediate Care visit does no Stephane Luna [address] ([address]) [address]  Sarah [facility]) [address] [facility] ([facility]) [phone] [address] [facility]. ([address]) [phone]

## (undated) NOTE — LETTER
November 12, 2020    [place] [id number]      To Whom it May Concern:     Rut Montes had PPD placement on 11/9/20 and PPD read on 11/12/20. TB test is negative, 0 mm induration.     Results for orders placed or performed

## (undated) NOTE — LETTER
Date & Time: 8/21/2022, 1:42 PM  Patient: Eugenie Ricksllor  Encounter Provider(s):    Adrian Talbert       To Whom It May Concern:    Marilee Quiñones was seen and treated in our department on 8/21/2022. Patient will return to work on Wednesday if feeling better and fever free.     If you have any questions or concerns, please do not hesitate to call.        _____________________________  Physician/APC Signature

## (undated) NOTE — LETTER
December 4, 2019    [place] [phone]      [facility] The following are the results of your recent tests. Please review the list of test results.   Your result is the value on the left; we have also supplied the

## (undated) NOTE — LETTER
Date & Time: 9/23/2021, 1:05 PM  Patient: Xochitl Barboza  Encounter Provider(s):    See, Marco James PA-C       To Whom It May Concern:    Lucian Alexander was seen and treated in our department on 9/23/2021. She can return to work tomorrow.     If you have

## (undated) NOTE — LETTER
February 27, 2019        [address] [place] [address]      Dear Abby Alu:    We have attempted to contact you by phone with no success.  In an effort to provide quality patient care we are reaching out to you to contact the office

## (undated) NOTE — LETTER
Date & Time: 2/3/2025, 3:42 PM  Patient: Sarah Gamboa  Encounter Provider(s):    Naveed Emery APRN       To Whom It May Concern:    Sarah Gamboa was seen and treated in our department on 02/03/25. Please excuse her from work until 02/06/25 when she can return if without fever (<100.4) and if feeling better.    If you have any questions or concerns, please do not hesitate to call.        __________________________________________  Naveed Emery DNP, JENNIFER, AGACNP-BC, FNP-C, CNL  Adult-Gerontology Acute Care & Family Nurse Practitioner  [facility]

## (undated) NOTE — LETTER
06/25/21        Osmin Neg  [address] [address]      Dear Tricia Reyes,  [facility] records indicate that you have outstanding lab work and or testing that was ordered for you and has not yet been completed:      CBC W Differential W Platelet

## (undated) NOTE — LETTER
Date & Time: 12/5/2023, 9:32 AM  Patient: Hina Gar  Encounter Provider(s):    JENNIFER Heath       To Whom It May Concern:    Yandel Reno was seen and treated in our department on 12/5/2023. She should not return to work until 12/8/23 . If you have any questions or concerns, please do not hesitate to call.         Barbara MICHELE

## (undated) NOTE — LETTER
[address] [facility], [address]            November 14, 2018      Ela Mata  [address] [address]      Dear Alberto Sood:    Our office has been trying to contact you to disc

## (undated) NOTE — LETTER
[address] [facility], [address] [place] [id number] [phone]            February 11, 2021      [place] [phone]      Dear Ms. Ku

## (undated) NOTE — LETTER
Date & Time: 11/7/2024, 4:57 PM  Patient: Sarah Gamboa  Encounter Provider(s):    Jacki Vasquez APRN       To Whom It May Concern:    Sarah Gamboa was seen and treated in our department on 11/7/2024. She should not return to work until 11/11/24 .    If you have any questions or concerns, please do not hesitate to call.        _____________________________  Physician/APC Signature